# Patient Record
Sex: MALE | Race: WHITE | NOT HISPANIC OR LATINO
[De-identification: names, ages, dates, MRNs, and addresses within clinical notes are randomized per-mention and may not be internally consistent; named-entity substitution may affect disease eponyms.]

---

## 2019-01-15 ENCOUNTER — TRANSCRIPTION ENCOUNTER (OUTPATIENT)
Age: 39
End: 2019-01-15

## 2021-05-05 ENCOUNTER — INPATIENT (INPATIENT)
Facility: HOSPITAL | Age: 41
LOS: 7 days | Discharge: HOME | End: 2021-05-13
Attending: INTERNAL MEDICINE | Admitting: INTERNAL MEDICINE
Payer: COMMERCIAL

## 2021-05-05 VITALS
RESPIRATION RATE: 22 BRPM | TEMPERATURE: 99 F | SYSTOLIC BLOOD PRESSURE: 130 MMHG | HEART RATE: 107 BPM | DIASTOLIC BLOOD PRESSURE: 94 MMHG | OXYGEN SATURATION: 81 %

## 2021-05-05 LAB
ALBUMIN SERPL ELPH-MCNC: 3.5 G/DL — SIGNIFICANT CHANGE UP (ref 3.5–5.2)
ALP SERPL-CCNC: 139 U/L — HIGH (ref 30–115)
ALT FLD-CCNC: 73 U/L — HIGH (ref 0–41)
ANION GAP SERPL CALC-SCNC: 14 MMOL/L — SIGNIFICANT CHANGE UP (ref 7–14)
ANISOCYTOSIS BLD QL: SIGNIFICANT CHANGE UP
AST SERPL-CCNC: 97 U/L — HIGH (ref 0–41)
BASE EXCESS BLDV CALC-SCNC: 4.4 MMOL/L — HIGH (ref -2–2)
BASOPHILS # BLD AUTO: 0 K/UL — SIGNIFICANT CHANGE UP (ref 0–0.2)
BASOPHILS NFR BLD AUTO: 0 % — SIGNIFICANT CHANGE UP (ref 0–1)
BILIRUB SERPL-MCNC: 0.3 MG/DL — SIGNIFICANT CHANGE UP (ref 0.2–1.2)
BUN SERPL-MCNC: 8 MG/DL — LOW (ref 10–20)
CA-I SERPL-SCNC: 1.07 MMOL/L — LOW (ref 1.12–1.3)
CALCIUM SERPL-MCNC: 7.7 MG/DL — LOW (ref 8.5–10.1)
CHLORIDE SERPL-SCNC: 102 MMOL/L — SIGNIFICANT CHANGE UP (ref 98–110)
CO2 SERPL-SCNC: 23 MMOL/L — SIGNIFICANT CHANGE UP (ref 17–32)
CREAT SERPL-MCNC: 0.5 MG/DL — LOW (ref 0.7–1.5)
D DIMER BLD IA.RAPID-MCNC: 531 NG/ML DDU — HIGH (ref 0–230)
EOSINOPHIL # BLD AUTO: 0 K/UL — SIGNIFICANT CHANGE UP (ref 0–0.7)
EOSINOPHIL NFR BLD AUTO: 0 % — SIGNIFICANT CHANGE UP (ref 0–8)
GAS PNL BLDV: 140 MMOL/L — SIGNIFICANT CHANGE UP (ref 136–145)
GAS PNL BLDV: SIGNIFICANT CHANGE UP
GIANT PLATELETS BLD QL SMEAR: PRESENT — SIGNIFICANT CHANGE UP
GLUCOSE SERPL-MCNC: 132 MG/DL — HIGH (ref 70–99)
HCO3 BLDV-SCNC: 30 MMOL/L — HIGH (ref 22–29)
HCT VFR BLD CALC: 37.2 % — LOW (ref 42–52)
HCT VFR BLDA CALC: 50.6 % — HIGH (ref 34–44)
HGB BLD CALC-MCNC: 16.5 G/DL — SIGNIFICANT CHANGE UP (ref 14–18)
HGB BLD-MCNC: 12.4 G/DL — LOW (ref 14–18)
LACTATE BLDV-MCNC: 2.2 MMOL/L — HIGH (ref 0.5–1.6)
LYMPHOCYTES # BLD AUTO: 0.23 K/UL — LOW (ref 1.2–3.4)
LYMPHOCYTES # BLD AUTO: 4.3 % — LOW (ref 20.5–51.1)
MANUAL SMEAR VERIFICATION: SIGNIFICANT CHANGE UP
MCHC RBC-ENTMCNC: 27.1 PG — SIGNIFICANT CHANGE UP (ref 27–31)
MCHC RBC-ENTMCNC: 33.3 G/DL — SIGNIFICANT CHANGE UP (ref 32–37)
MCV RBC AUTO: 81.2 FL — SIGNIFICANT CHANGE UP (ref 80–94)
MICROCYTES BLD QL: SIGNIFICANT CHANGE UP
MONOCYTES # BLD AUTO: 0.19 K/UL — SIGNIFICANT CHANGE UP (ref 0.1–0.6)
MONOCYTES NFR BLD AUTO: 3.5 % — SIGNIFICANT CHANGE UP (ref 1.7–9.3)
MYELOCYTES NFR BLD: 0.9 % — HIGH (ref 0–0)
NEUTROPHILS # BLD AUTO: 4.88 K/UL — SIGNIFICANT CHANGE UP (ref 1.4–6.5)
NEUTROPHILS NFR BLD AUTO: 84.3 % — HIGH (ref 42.2–75.2)
NEUTS BAND # BLD: 7 % — HIGH (ref 0–6)
PCO2 BLDV: 48 MMHG — SIGNIFICANT CHANGE UP (ref 41–51)
PH BLDV: 7.41 — SIGNIFICANT CHANGE UP (ref 7.26–7.43)
PLAT MORPH BLD: NORMAL — SIGNIFICANT CHANGE UP
PLATELET # BLD AUTO: 158 K/UL — SIGNIFICANT CHANGE UP (ref 130–400)
PO2 BLDV: 24 MMHG — SIGNIFICANT CHANGE UP (ref 20–40)
POIKILOCYTOSIS BLD QL AUTO: SLIGHT — SIGNIFICANT CHANGE UP
POLYCHROMASIA BLD QL SMEAR: SLIGHT — SIGNIFICANT CHANGE UP
POTASSIUM BLDV-SCNC: 3.9 MMOL/L — SIGNIFICANT CHANGE UP (ref 3.3–5.6)
POTASSIUM SERPL-MCNC: 4 MMOL/L — SIGNIFICANT CHANGE UP (ref 3.5–5)
POTASSIUM SERPL-SCNC: 4 MMOL/L — SIGNIFICANT CHANGE UP (ref 3.5–5)
PROT SERPL-MCNC: 6.1 G/DL — SIGNIFICANT CHANGE UP (ref 6–8)
RBC # BLD: 4.58 M/UL — LOW (ref 4.7–6.1)
RBC # FLD: 13 % — SIGNIFICANT CHANGE UP (ref 11.5–14.5)
RBC BLD AUTO: ABNORMAL
SAO2 % BLDV: 40 % — SIGNIFICANT CHANGE UP
SARS-COV-2 RNA SPEC QL NAA+PROBE: DETECTED
SODIUM SERPL-SCNC: 139 MMOL/L — SIGNIFICANT CHANGE UP (ref 135–146)
TROPONIN T SERPL-MCNC: <0.01 NG/ML — SIGNIFICANT CHANGE UP
WBC # BLD: 5.35 K/UL — SIGNIFICANT CHANGE UP (ref 4.8–10.8)
WBC # FLD AUTO: 5.35 K/UL — SIGNIFICANT CHANGE UP (ref 4.8–10.8)

## 2021-05-05 PROCEDURE — 71045 X-RAY EXAM CHEST 1 VIEW: CPT | Mod: 26

## 2021-05-05 PROCEDURE — 93010 ELECTROCARDIOGRAM REPORT: CPT

## 2021-05-05 PROCEDURE — 93970 EXTREMITY STUDY: CPT | Mod: 26

## 2021-05-05 PROCEDURE — 99285 EMERGENCY DEPT VISIT HI MDM: CPT

## 2021-05-05 RX ORDER — CEFTRIAXONE 500 MG/1
1000 INJECTION, POWDER, FOR SOLUTION INTRAMUSCULAR; INTRAVENOUS EVERY 24 HOURS
Refills: 0 | Status: DISCONTINUED | OUTPATIENT
Start: 2021-05-06 | End: 2021-05-06

## 2021-05-05 RX ORDER — CHLORHEXIDINE GLUCONATE 213 G/1000ML
1 SOLUTION TOPICAL
Refills: 0 | Status: DISCONTINUED | OUTPATIENT
Start: 2021-05-05 | End: 2021-05-13

## 2021-05-05 RX ORDER — ENOXAPARIN SODIUM 100 MG/ML
40 INJECTION SUBCUTANEOUS EVERY 12 HOURS
Refills: 0 | Status: DISCONTINUED | OUTPATIENT
Start: 2021-05-05 | End: 2021-05-07

## 2021-05-05 RX ORDER — AZITHROMYCIN 500 MG/1
500 TABLET, FILM COATED ORAL EVERY 24 HOURS
Refills: 0 | Status: DISCONTINUED | OUTPATIENT
Start: 2021-05-06 | End: 2021-05-06

## 2021-05-05 RX ORDER — REMDESIVIR 5 MG/ML
200 INJECTION INTRAVENOUS EVERY 24 HOURS
Refills: 0 | Status: COMPLETED | OUTPATIENT
Start: 2021-05-05 | End: 2021-05-06

## 2021-05-05 RX ORDER — CEFTRIAXONE 500 MG/1
1000 INJECTION, POWDER, FOR SOLUTION INTRAMUSCULAR; INTRAVENOUS ONCE
Refills: 0 | Status: COMPLETED | OUTPATIENT
Start: 2021-05-05 | End: 2021-05-05

## 2021-05-05 RX ORDER — REMDESIVIR 5 MG/ML
INJECTION INTRAVENOUS
Refills: 0 | Status: COMPLETED | OUTPATIENT
Start: 2021-05-05 | End: 2021-05-10

## 2021-05-05 RX ORDER — DEXAMETHASONE 0.5 MG/5ML
6 ELIXIR ORAL DAILY
Refills: 0 | Status: DISCONTINUED | OUTPATIENT
Start: 2021-05-05 | End: 2021-05-07

## 2021-05-05 RX ORDER — AZITHROMYCIN 500 MG/1
500 TABLET, FILM COATED ORAL ONCE
Refills: 0 | Status: COMPLETED | OUTPATIENT
Start: 2021-05-05 | End: 2021-05-05

## 2021-05-05 RX ADMIN — ENOXAPARIN SODIUM 40 MILLIGRAM(S): 100 INJECTION SUBCUTANEOUS at 23:36

## 2021-05-05 RX ADMIN — Medication 6 MILLIGRAM(S): at 23:36

## 2021-05-05 NOTE — ED PROVIDER NOTE - OBJECTIVE STATEMENT
40y M pmh COVID (+) since 5/2 presents for sob. Pt has worsening sob since being diagnosed with COVID 5/2, mild improvement with NC, aggravated with ambulation. Associated np cough. Pt states he was hospitalized at [facility] but AMA'd. Denies, fever, ha, cp, weakness, numbness, dysuria, hematuria, n/v/d/c

## 2021-05-05 NOTE — ED PROVIDER NOTE - ATTENDING CONTRIBUTION TO CARE
41 y/o male with no sig pmhx in ER with covid related complaints.  Started having symptoms last week along with other family members, had + test 3 days ago. Pt in ER with c/o SOB and worsening REID.  mild cough.  denies CP. no fever.  no abd pain.  no v/d.  no LE pain/swelling.  No ha/dizziness/syncope. Pt went to [facility] for monoclonal ab treatment, was found to be hypoxic, given zithro, rocephin, dose of decadron.  Pt says his family his here and didn't want to be admitted at [place], so came here.    PE - nad, nc/art, eomi, perrl, op - clear, mmm, neck supple, + mildly increased WOB but able to speak full sentences, + b/l crackles, rrr, abd- soft, nt/nd, nabs, from x 4, no LE swelling/tenderness, A&O x 3, no focal neuro deficits  -Pt hypoxic in ER (86% on NRB), to get HFNC, check labs, cxr, ICU eval

## 2021-05-05 NOTE — H&P ADULT - ATTENDING COMMENTS
39 y/o obese Male without significant PMX presented to the ED with worsening SOB and Hypoxia, admitted to SDU with severe COVID PNA  Onset of symptoms 5/1    Acute hypoxemic respiratory failure secondary to COVID 19 PNA  Suspected superimposed bacterial PNA  Obesity  Transaminitis  Currently on HFNC 60/100 saturating 91%  Procal 2.08, Ddimer 531   LE duplex 5/5 without DVTs,, c/w Lovenox for DVT ppx  Rocephin 2g Q24H and Levaquin 500 Q24H  check blood cx, Urine strep and legionella  on RDV started 5/5 and Dexamethasone 6mg daily, started 5/5  Too early for toci currently, encourage incentive spirometry   taper down supplemental oxygen as tolerated  trend inflammatory markers  pulm cc following    Patient requires continuous monitoring in CEU

## 2021-05-05 NOTE — H&P ADULT - HISTORY OF PRESENT ILLNESS
41 y/o M with no PMH presents with SOB. Patient woke up May 1 with bad headache. His wife had tested positive for COVID, so he went to get tested and was positive. Today he noticed his SOB was worsening so he went get plasma, while there they noted he was saturating in low 80s so they did not give him plasma and sent him to the hospital. He denies chest pain, leg pain, abdominal pain, fever, chills or any other symptoms.    ED course: T(F): 99.1, HR: 91, BP: 130/78, RR: 20, SpO2: 82% on 5L. Xray showed diffuse b/l patchy opacities. COVID positive. He received ceftriaxone and azithromycin and is admitted to ICU with diagnosis of COVID pneumonia for close monitoring. 39 y/o M with no PMH presents with SOB. Patient woke up May 1 with bad headache. His wife had tested positive for COVID, so he went to get tested and was positive. Today he noticed his SOB was worsening so he went get plasma, while there they noted he was saturating in low 80s so they did not give him plasma and sent him to the hospital. He denies chest pain, leg pain, abdominal pain, fever, chills or any other symptoms.    ED course: T(F): 99.1, HR: 91, BP: 130/78, RR: 20, SpO2: 82% on 5L. Xray showed diffuse b/l patchy opacities. COVID positive. High flow at 60/100%, saturating 98%. He received ceftriaxone and azithromycin and is admitted to ICU with diagnosis of COVID pneumonia for close monitoring. 39 y/o M, obese with no PMH presents with SOB. Patient woke up May 1 with bad headache. His wife had tested positive for COVID, so he went to get tested and was positive. Today he noticed his SOB was worsening so he went get plasma, while there they noted he was saturating in low 80s so they did not give him plasma and sent him to the hospital. He denies chest pain, leg pain, abdominal pain, fever, chills or any other symptoms.    ED course: T(F): 99.1, HR: 91, BP: 130/78, RR: 20, SpO2: 82% on 5L. Xray showed diffuse b/l patchy opacities. COVID positive. High flow at 60/100%, saturating 98%. He received ceftriaxone and azithromycin and is admitted to ICU with diagnosis of COVID pneumonia for close monitoring.

## 2021-05-05 NOTE — H&P ADULT - NSHPPHYSICALEXAM_GEN_ALL_CORE
Vital Signs Last 24 Hrs  T(C): 37.3 (05 May 2021 21:41), Max: 37.5 (05 May 2021 20:48)  T(F): 99.1 (05 May 2021 21:41), Max: 99.5 (05 May 2021 20:48)  HR: 91 (05 May 2021 21:41) (90 - 107)  BP: 130/78 (05 May 2021 21:41) (119/67 - 130/94)  BP(mean): 95 (05 May 2021 19:30) (95 - 95)  RR: 20 (05 May 2021 21:41) (20 - 22)  SpO2: 93% (05 May 2021 21:41) (81% - 96%)        GENERAL: NAD, lying in bed comfortably  HEAD:  Atraumatic, Normocephalic  EYES: EOMI, PERRLA, conjunctiva and sclera clear  ENT: Moist mucous membranes  NECK: Supple, No JVD  CHEST/LUNG: Unlabored respirations  HEART: Regular rate and rhythm; No murmurs, rubs, or gallops  ABDOMEN: Bowel sounds present; Soft, Nontender  EXTREMITIES: No clubbing, cyanosis, or edema  NERVOUS SYSTEM:  Alert & Oriented X3, speech clear. No deficits Vital Signs Last 24 Hrs  T(C): 37.3 (05 May 2021 21:41), Max: 37.5 (05 May 2021 20:48)  T(F): 99.1 (05 May 2021 21:41), Max: 99.5 (05 May 2021 20:48)  HR: 91 (05 May 2021 21:41) (90 - 107)  BP: 130/78 (05 May 2021 21:41) (119/67 - 130/94)  BP(mean): 95 (05 May 2021 19:30) (95 - 95)  RR: 20 (05 May 2021 21:41) (20 - 22)  SpO2: 93% (05 May 2021 21:41) (81% - 96%)      GENERAL: NAD, lying in bed comfortably  HEAD:  Atraumatic, Normocephalic  EYES: EOMI, conjunctiva and sclera clear  ENT: Moist mucous membranes  NECK: Supple, No JVD  CHEST/LUNG: Unlabored respirations, coarse breath sounds  HEART: Regular rate and rhythm; No murmurs, rubs, or gallops  ABDOMEN: Bowel sounds present; Soft, Nontender  EXTREMITIES: No clubbing, cyanosis, or edema  NERVOUS SYSTEM:  Alert & Oriented X3, speech clear. No deficits

## 2021-05-05 NOTE — ED ADULT NURSE NOTE - NSSEPSISNEWALTERMENTAL_ED_A_ED
No Implemented All Universal Safety Interventions:  [place] to call system. Call bell, personal items and telephone within reach. Instruct patient to call for assistance. Room bathroom lighting operational. Non-slip footwear when patient is off stretcher. Physically safe environment: no spills, clutter or unnecessary equipment. Stretcher in lowest position, wheels locked, appropriate side rails in place.

## 2021-05-05 NOTE — ED ADULT NURSE REASSESSMENT NOTE - NS ED NURSE REASSESS COMMENT FT1
Assumed care from previous day shift nurse Lety CALDERON c/o SOB , + COVID , for ICU admission , awaiting for bed . Pt AO x 4 , on high flow nasal cannula 60 L, 100 % FIO2 , afebrile this time , no respiratory distress noted this time , pt denies chest pain , speaks in full sentences , denies headache , denies dizziness , denies palpitation , denies  abdominal pain , denies nausea , no vomiting noted , no syncopal episode , no seizure episode , noted bilateral swelling with mild redness at bilateral LE , pt able to move all extremities , call light within reached , placed on continuous cardiac monitor , nursing rounds done

## 2021-05-05 NOTE — H&P ADULT - ASSESSMENT
IMPRESSION:  Severe COVID pneumonia with possible superimposed community acquired pneumonia      PLAN:    CNS: Off sedatives    HEENT: Oral care    PULMONARY:  HOB @ 45 degrees. Dexamethasone 6 daily. High flow with BiPAP at night    CARDIOVASCULAR: I = O    GI: Feeding    RENAL:  Follow up lytes.  Correct as needed    INFECTIOUS DISEASE: Remdesivir. COVID antibodies for plasma. F/u inflammatory markers. Continue ceftriaxone and azithromycin until procal negative    HEMATOLOGICAL:  DVT prophylaxis. LE U/S    ENDOCRINE:  Follow up FS.  Insulin protocol if needed.    MUSCULOSKELETAL: ambulate as tolerated           IMPRESSION:  Severe COVID pneumonia with possible superimposed community acquired pneumonia      PLAN:    CNS: Off sedatives    HEENT: Oral care    PULMONARY:  HOB @ 45 degrees. Dexamethasone 6 daily. High flow with BiPAP at night. Titrate FiO2 to target saturation >91%    CARDIOVASCULAR: I = O    GI: Feeding    RENAL:  Follow up lytes.  Correct as needed    INFECTIOUS DISEASE: Remdesivir. COVID antibodies for plasma. F/u inflammatory markers. Continue ceftriaxone and azithromycin until procal negative    HEMATOLOGICAL:  DVT prophylaxis. LE U/S    ENDOCRINE:  Follow up FS.  Insulin protocol if needed.    MUSCULOSKELETAL: ambulate as tolerated

## 2021-05-05 NOTE — H&P ADULT - NSHPLABSRESULTS_GEN_ALL_CORE
LABS:  cret                        12.4   5.35  )-----------( 158      ( 05 May 2021 14:52 )             37.2     05-05    139  |  102  |  8<L>  ----------------------------<  132<H>  4.0   |  23  |  0.5<L>    Ca    7.7<L>      05 May 2021 14:52    TPro  6.1  /  Alb  3.5  /  TBili  0.3  /  DBili  x   /  AST  97<H>  /  ALT  73<H>  /  AlkPhos  139<H>  05-05

## 2021-05-05 NOTE — ED ADULT TRIAGE NOTE - AS TEMP SITE
Per Bertis Litten, PA-C, a message was left on [facility]' voicemail notifying them that Ehsan Colon had changed the directions on the Mirtazapine to 1/2 tablet by mouth nightly. oral

## 2021-05-05 NOTE — ED PROVIDER NOTE - PROGRESS NOTE DETAILS
Pt placed on HFNC with improvement in O2 sat to 97%, pt feels more comfortable. Labs reviewed.  CXR with extensive b/l infiltrates.  Pt already received abx and dex at OSH earlier today. Case d/w ICU fellow, pt admitted to ICU for continued treatment and monitoring.

## 2021-05-05 NOTE — ED PROVIDER NOTE - PHYSICAL EXAMINATION
CONST: NAD  EYES: Sclera and conjunctiva clear.   ENT: No nasal discharge. Oropharynx normal appearing, no erythema or exudates. No abscess or swelling. Uvula midline.   NECK: Non-tender, no meningeal signs. normal ROM. supple   CARD: S1 S2; No jvd  RESP: Diffuse rhonchi. spO2 86% nrb, improved to 96% on High Flow  GI: Soft, non-tender, non-distended. no cva tenderness. normal BS  MS: Normal ROM in all extremities. pulses 2 +. no calf tenderness or swelling  SKIN: Warm, dry, no acute rashes. Good turgor  NEURO: A&Ox3, No focal deficits. Strength 5/5 with no sensory deficits.

## 2021-05-05 NOTE — ED PROVIDER NOTE - NS ED ROS FT
Constitutional: (-) fever  Eyes/ENT: (-) blurry vision, (-) epistaxis  Cardiovascular: (-) chest pain, (-) syncope  Respiratory: (+) cough, (+) shortness of breath  Gastrointestinal: (-) vomiting, (-) diarrhea  : (-) dysuria, (-) hematuria  Musculoskeletal: (-) neck pain, (-) back pain, (-) joint pain  Integumentary: (-) rash, (-) edema  Neurological: (-) headache, (-) altered mental status  Allergic/Immunologic: (-) pruritus

## 2021-05-06 LAB
ALBUMIN SERPL ELPH-MCNC: 3.1 G/DL — LOW (ref 3.5–5.2)
ALP SERPL-CCNC: 140 U/L — HIGH (ref 30–115)
ALT FLD-CCNC: 67 U/L — HIGH (ref 0–41)
ANION GAP SERPL CALC-SCNC: 15 MMOL/L — HIGH (ref 7–14)
AST SERPL-CCNC: 87 U/L — HIGH (ref 0–41)
BASOPHILS # BLD AUTO: 0.01 K/UL — SIGNIFICANT CHANGE UP (ref 0–0.2)
BASOPHILS NFR BLD AUTO: 0.2 % — SIGNIFICANT CHANGE UP (ref 0–1)
BILIRUB SERPL-MCNC: 0.3 MG/DL — SIGNIFICANT CHANGE UP (ref 0.2–1.2)
BLD GP AB SCN SERPL QL: SIGNIFICANT CHANGE UP
BUN SERPL-MCNC: 12 MG/DL — SIGNIFICANT CHANGE UP (ref 10–20)
CALCIUM SERPL-MCNC: 8 MG/DL — LOW (ref 8.5–10.1)
CHLORIDE SERPL-SCNC: 100 MMOL/L — SIGNIFICANT CHANGE UP (ref 98–110)
CO2 SERPL-SCNC: 23 MMOL/L — SIGNIFICANT CHANGE UP (ref 17–32)
COVID-19 SPIKE DOMAIN AB INTERP: POSITIVE
COVID-19 SPIKE DOMAIN AB INTERP: POSITIVE
COVID-19 SPIKE DOMAIN ANTIBODY RESULT: 19.6 U/ML — HIGH
COVID-19 SPIKE DOMAIN ANTIBODY RESULT: 52.9 U/ML — HIGH
CREAT SERPL-MCNC: 0.5 MG/DL — LOW (ref 0.7–1.5)
CRP SERPL-MCNC: 347 MG/L — HIGH
EOSINOPHIL # BLD AUTO: 0 K/UL — SIGNIFICANT CHANGE UP (ref 0–0.7)
EOSINOPHIL NFR BLD AUTO: 0 % — SIGNIFICANT CHANGE UP (ref 0–8)
FERRITIN SERPL-MCNC: 1353 NG/ML — HIGH (ref 30–400)
GLUCOSE SERPL-MCNC: 144 MG/DL — HIGH (ref 70–99)
HCT VFR BLD CALC: 37.5 % — LOW (ref 42–52)
HGB BLD-MCNC: 12.3 G/DL — LOW (ref 14–18)
IMM GRANULOCYTES NFR BLD AUTO: 1.8 % — HIGH (ref 0.1–0.3)
LYMPHOCYTES # BLD AUTO: 0.45 K/UL — LOW (ref 1.2–3.4)
LYMPHOCYTES # BLD AUTO: 8.3 % — LOW (ref 20.5–51.1)
MCHC RBC-ENTMCNC: 26.9 PG — LOW (ref 27–31)
MCHC RBC-ENTMCNC: 32.8 G/DL — SIGNIFICANT CHANGE UP (ref 32–37)
MCV RBC AUTO: 81.9 FL — SIGNIFICANT CHANGE UP (ref 80–94)
MONOCYTES # BLD AUTO: 0.11 K/UL — SIGNIFICANT CHANGE UP (ref 0.1–0.6)
MONOCYTES NFR BLD AUTO: 2 % — SIGNIFICANT CHANGE UP (ref 1.7–9.3)
NEUTROPHILS # BLD AUTO: 4.77 K/UL — SIGNIFICANT CHANGE UP (ref 1.4–6.5)
NEUTROPHILS NFR BLD AUTO: 87.7 % — HIGH (ref 42.2–75.2)
NRBC # BLD: 0 /100 WBCS — SIGNIFICANT CHANGE UP (ref 0–0)
PLATELET # BLD AUTO: 181 K/UL — SIGNIFICANT CHANGE UP (ref 130–400)
POTASSIUM SERPL-MCNC: 3.7 MMOL/L — SIGNIFICANT CHANGE UP (ref 3.5–5)
POTASSIUM SERPL-SCNC: 3.7 MMOL/L — SIGNIFICANT CHANGE UP (ref 3.5–5)
PROCALCITONIN SERPL-MCNC: 2.08 NG/ML — HIGH (ref 0.02–0.1)
PROT SERPL-MCNC: 5.8 G/DL — LOW (ref 6–8)
RBC # BLD: 4.58 M/UL — LOW (ref 4.7–6.1)
RBC # FLD: 12.9 % — SIGNIFICANT CHANGE UP (ref 11.5–14.5)
SARS-COV-2 IGG+IGM SERPL QL IA: 19.6 U/ML — HIGH
SARS-COV-2 IGG+IGM SERPL QL IA: 52.9 U/ML — HIGH
SARS-COV-2 IGG+IGM SERPL QL IA: POSITIVE
SARS-COV-2 IGG+IGM SERPL QL IA: POSITIVE
SODIUM SERPL-SCNC: 138 MMOL/L — SIGNIFICANT CHANGE UP (ref 135–146)
WBC # BLD: 5.44 K/UL — SIGNIFICANT CHANGE UP (ref 4.8–10.8)
WBC # FLD AUTO: 5.44 K/UL — SIGNIFICANT CHANGE UP (ref 4.8–10.8)

## 2021-05-06 PROCEDURE — 99223 1ST HOSP IP/OBS HIGH 75: CPT

## 2021-05-06 PROCEDURE — 99222 1ST HOSP IP/OBS MODERATE 55: CPT

## 2021-05-06 RX ORDER — CEFTRIAXONE 500 MG/1
2000 INJECTION, POWDER, FOR SOLUTION INTRAMUSCULAR; INTRAVENOUS EVERY 24 HOURS
Refills: 0 | Status: COMPLETED | OUTPATIENT
Start: 2021-05-06 | End: 2021-05-11

## 2021-05-06 RX ORDER — REMDESIVIR 5 MG/ML
100 INJECTION INTRAVENOUS EVERY 24 HOURS
Refills: 0 | Status: COMPLETED | OUTPATIENT
Start: 2021-05-07 | End: 2021-05-10

## 2021-05-06 RX ORDER — ACETAMINOPHEN 500 MG
650 TABLET ORAL ONCE
Refills: 0 | Status: COMPLETED | OUTPATIENT
Start: 2021-05-06 | End: 2021-05-06

## 2021-05-06 RX ORDER — ALPRAZOLAM 0.25 MG
0.25 TABLET ORAL ONCE
Refills: 0 | Status: DISCONTINUED | OUTPATIENT
Start: 2021-05-06 | End: 2021-05-06

## 2021-05-06 RX ADMIN — Medication 0.25 MILLIGRAM(S): at 22:10

## 2021-05-06 RX ADMIN — ENOXAPARIN SODIUM 40 MILLIGRAM(S): 100 INJECTION SUBCUTANEOUS at 17:34

## 2021-05-06 RX ADMIN — AZITHROMYCIN 255 MILLIGRAM(S): 500 TABLET, FILM COATED ORAL at 08:06

## 2021-05-06 RX ADMIN — CEFTRIAXONE 100 MILLIGRAM(S): 500 INJECTION, POWDER, FOR SOLUTION INTRAMUSCULAR; INTRAVENOUS at 11:20

## 2021-05-06 RX ADMIN — Medication 650 MILLIGRAM(S): at 11:26

## 2021-05-06 RX ADMIN — Medication 0.25 MILLIGRAM(S): at 05:17

## 2021-05-06 RX ADMIN — ENOXAPARIN SODIUM 40 MILLIGRAM(S): 100 INJECTION SUBCUTANEOUS at 06:28

## 2021-05-06 RX ADMIN — REMDESIVIR 500 MILLIGRAM(S): 5 INJECTION INTRAVENOUS at 05:17

## 2021-05-06 RX ADMIN — CEFTRIAXONE 100 MILLIGRAM(S): 500 INJECTION, POWDER, FOR SOLUTION INTRAMUSCULAR; INTRAVENOUS at 08:06

## 2021-05-06 NOTE — PROGRESS NOTE ADULT - SUBJECTIVE AND OBJECTIVE BOX
MICHAEL ARENAS 40y Male  MRN#: [id number]   CODE STATUS:_full      SUBJECTIVE  Patient is a 40y old Male who presents with a chief complaint of COVID (06 May 2021 09:57)  Currently admitted to medicine with the primary diagnosis of COVID-19    Today is hospital day 1d, and this morning he is feeling well and reports no events overnight. Denies shortness of breath currently while in bed on HFNC.         OBJECTIVE  PAST MEDICAL & SURGICAL HISTORY  No pertinent past medical history    No significant past surgical history      ALLERGIES:  No Known Allergies    MEDICATIONS:  STANDING MEDICATIONS  cefTRIAXone   IVPB 2000 milliGRAM(s) IV Intermittent every 24 hours  chlorhexidine 4% Liquid 1 Application(s) Topical <User Schedule>  dexAMETHasone  Injectable 6 milliGRAM(s) IV Push daily  enoxaparin Injectable 40 milliGRAM(s) SubCutaneous every 12 hours  levoFLOXacin IVPB      remdesivir  IVPB   IV Intermittent     PRN MEDICATIONS      VITAL SIGNS: Last 24 Hours  T(C): 37.3 (06 May 2021 08:28), Max: 37.5 (05 May 2021 20:48)  T(F): 99.2 (06 May 2021 08:28), Max: 99.5 (05 May 2021 20:48)  HR: 85 (06 May 2021 08:28) (81 - 107)  BP: 122/73 (06 May 2021 08:28) (113/58 - 153/72)  BP(mean): 95 (05 May 2021 19:30) (95 - 95)  RR: 20 (06 May 2021 08:28) (20 - 22)  SpO2: 91% (06 May 2021 08:28) (81% - 96%)    LABS:                        12.3   5.44  )-----------( 181      ( 06 May 2021 08:28 )             37.5     05-06    138  |  100  |  12  ----------------------------<  144<H>  3.7   |  23  |  0.5<L>    Ca    8.0<L>      06 May 2021 08:28    TPro  5.8<L>  /  Alb  3.1<L>  /  TBili  0.3  /  DBili  x   /  AST  87<H>  /  ALT  67<H>  /  AlkPhos  140<H>  05-06          Troponin T, Serum: <0.01 ng/mL (05-05-21 @ 14:52)      CARDIAC MARKERS ( 05 May 2021 14:52 )  x     / <0.01 ng/mL / x     / x     / x          RADIOLOGY:      PHYSICAL EXAM:    GENERAL: NAD, well-developed, AAOx3  HEENT:  Atraumatic, Normocephalic. EOMI, PERRLA,  PULMONARY: Bilateral crackles throughout all lung fields.   CARDIOVASCULAR: Regular rate and rhythm; No murmurs,   GASTROINTESTINAL: Soft, Nontender, Nondistended; Bowel sounds present  MUSCULOSKELETAL:  2+ Peripheral Pulses, No clubbing, cyanosis, or edema  NEUROLOGY: non-focal  SKIN: No rashes or lesions      ADMISSION SUMMARY  Patient is a 40y old Male who presents with a chief complaint of COVID (06 May 2021 09:57)  Currently admitted to medicine with the primary diagnosis of COVID-19    Hospital course has been complicated by _______.       ASSESSMENT & PLAN  39 y/o no significant past medical history presenting for shortness of breath since 5/3 (tested positive for COVID outpatient 5/1? after he had headache), admitted for severe COVID PNA requiring HFNC to maintain O2 saturation.     #Severe COVID PNA with suspected superimposed bacterial infection   -f/u urine strep/legionella (pending), blood culture (pending)   -, ferritin 1353, procalcitonin 2.08, d-dimer 531.   -Lovenox 40mg BID as BMI>30 with COVID   -ID followup - too early for toci - plan to give day 10, currently he is around day 5. abx changed: ceftriaxone 2g q24 and levaquin 500mg BID  MICHAEL ARENAS 40y Male  MRN#: [id number]   CODE STATUS:_full      SUBJECTIVE  Patient is a 40y old Male who presents with a chief complaint of COVID (06 May 2021 09:57)  Currently admitted to medicine with the primary diagnosis of COVID-19    Today is hospital day 1d, and this morning he is feeling well and reports no events overnight. Denies shortness of breath currently while in bed on HFNC.         OBJECTIVE  PAST MEDICAL & SURGICAL HISTORY  No pertinent past medical history    No significant past surgical history      ALLERGIES:  No Known Allergies    MEDICATIONS:  STANDING MEDICATIONS  cefTRIAXone   IVPB 2000 milliGRAM(s) IV Intermittent every 24 hours  chlorhexidine 4% Liquid 1 Application(s) Topical <User Schedule>  dexAMETHasone  Injectable 6 milliGRAM(s) IV Push daily  enoxaparin Injectable 40 milliGRAM(s) SubCutaneous every 12 hours  levoFLOXacin IVPB      remdesivir  IVPB   IV Intermittent     PRN MEDICATIONS      VITAL SIGNS: Last 24 Hours  T(C): 37.3 (06 May 2021 08:28), Max: 37.5 (05 May 2021 20:48)  T(F): 99.2 (06 May 2021 08:28), Max: 99.5 (05 May 2021 20:48)  HR: 85 (06 May 2021 08:28) (81 - 107)  BP: 122/73 (06 May 2021 08:28) (113/58 - 153/72)  BP(mean): 95 (05 May 2021 19:30) (95 - 95)  RR: 20 (06 May 2021 08:28) (20 - 22)  SpO2: 91% (06 May 2021 08:28) (81% - 96%)    LABS:                        12.3   5.44  )-----------( 181      ( 06 May 2021 08:28 )             37.5     05-06    138  |  100  |  12  ----------------------------<  144<H>  3.7   |  23  |  0.5<L>    Ca    8.0<L>      06 May 2021 08:28    TPro  5.8<L>  /  Alb  3.1<L>  /  TBili  0.3  /  DBili  x   /  AST  87<H>  /  ALT  67<H>  /  AlkPhos  140<H>  05-06          Troponin T, Serum: <0.01 ng/mL (05-05-21 @ 14:52)      CARDIAC MARKERS ( 05 May 2021 14:52 )  x     / <0.01 ng/mL / x     / x     / x          RADIOLOGY:      PHYSICAL EXAM:    GENERAL: NAD, well-developed, AAOx3  HEENT:  Atraumatic, Normocephalic. EOMI, PERRLA,  PULMONARY: Bilateral crackles throughout all lung fields.   CARDIOVASCULAR: Regular rate and rhythm; No murmurs,   GASTROINTESTINAL: Soft, Nontender, Nondistended; Bowel sounds present  MUSCULOSKELETAL:  2+ Peripheral Pulses, No clubbing, cyanosis, or edema  NEUROLOGY: non-focal  SKIN: No rashes or lesions      ADMISSION SUMMARY  Patient is a 40y old Male who presents with a chief complaint of COVID (06 May 2021 09:57)  Currently admitted to medicine with the primary diagnosis of COVID-19    Hospital course has been complicated by _______.       ASSESSMENT & PLAN  41 y/o no significant past medical history presenting for shortness of breath since 5/3 (tested positive for COVID outpatient 5/1? after he had headache), admitted for severe COVID PNA requiring HFNC to maintain O2 saturation.     #Severe COVID PNA with suspected superimposed bacterial infection   -f/u urine strep/legionella (pending), blood culture (pending)   -, ferritin 1353, procalcitonin 2.08, d-dimer 531.   -Lovenox 40mg BID as BMI>30 with COVID   -ID followup - too early for toci - plan to give day 10, currently he is around day 5. abx changed: ceftriaxone 2g q24hr and levaquin 500mg q24hr  -CXR with extensive bilateral opacities  - c/w remdesivir (day 1 was 5/5), dexamethasone 6mg q24 (day 1 was 5/5)  - Wean O2 as tolerated , HFNC 60/100/ bipap overnight.   -f/u pulm   -LE duplex 5/5 negative for dvt (prelim read)     #Diet - regular   #GI ppx - not indicated  #DVT ppx - lovenox 40 BID   #Code status - full

## 2021-05-06 NOTE — CONSULT NOTE ADULT - SUBJECTIVE AND OBJECTIVE BOX
Patient is a 40y old  Male who presents with a chief complaint of COVID (05 May 2021 22:10)      HPI:  39 y/o M with no PMH presents with SOB. Patient woke up May 1 with bad headache. His wife had tested positive for COVID, so he went to get tested and was positive. Today he noticed his SOB was worsening so he went get plasma, while there they noted he was saturating in low 80s so they did not give him plasma and sent him to the hospital. He denies chest pain, leg pain, abdominal pain, fever, chills or any other symptoms.    ED course: T(F): 99.1, HR: 91, BP: 130/78, RR: 20, SpO2: 82% on 5L. Xray showed diffuse b/l patchy opacities. COVID positive. High flow at 60/100%, saturating 98%. He received ceftriaxone and azithromycin and is admitted to ICU with diagnosis of COVID pneumonia for close monitoring. (05 May 2021 22:10)      PAST MEDICAL & SURGICAL HISTORY:  No pertinent past medical history    No significant past surgical history        SOCIAL HX:   Smoking     No                    ETOH                            Other    FAMILY HISTORY:  :  No known cardiovacular family hisotry     Review Of Systems:     All ROS are negative except per HPI       Allergies    No Known Allergies    Intolerances          PHYSICAL EXAM    ICU Vital Signs Last 24 Hrs  T(C): 37.3 (06 May 2021 04:00), Max: 37.5 (05 May 2021 20:48)  T(F): 99.2 (06 May 2021 04:00), Max: 99.5 (05 May 2021 20:48)  HR: 86 (06 May 2021 07:00) (81 - 107)  BP: 134/76 (06 May 2021 07:00) (113/58 - 153/72)  BP(mean): 95 (05 May 2021 19:30) (95 - 95)  ABP: --  ABP(mean): --  RR: 20 (06 May 2021 07:00) (20 - 22)  SpO2: 91% (06 May 2021 07:00) (81% - 96%)      CONSTITUTIONAL:  Well nourished.   NAD    ENT:   Airway patent,   Mouth with normal mucosa.   No thrush      CARDIAC:   Normal rate,   Regular rhythm.    No edema      Vascular:   normal systolic impulse  no bruits    RESPIRATORY:   No wheezing  Bilateral crackles   Not tachypneic,  No use of accessory muscles    GASTROINTESTINAL:  Abdomen soft,   Non-tender,   No guarding,   + BS      NEUROLOGICAL:   Alert and oriented   No motor deficits.    SKIN:   Skin normal color for race,   No evidence of rash.      HEME LYMPH: .  No cervical  lymphadenopathy.  No inguinal lymphadenopathy            05-05-21 @ 07:01  -  05-06-21 @ 07:00  --------------------------------------------------------  IN:    IV PiggyBack: 250 mL    Oral Fluid: 100 mL  Total IN: 350 mL    OUT:    Voided (mL): 600 mL  Total OUT: 600 mL    Total NET: -250 mL          LABS:                          12.4   5.35  )-----------( 158      ( 05 May 2021 14:52 )             37.2                                               05-05    139  |  102  |  8<L>  ----------------------------<  132<H>  4.0   |  23  |  0.5<L>    Ca    7.7<L>      05 May 2021 14:52    TPro  6.1  /  Alb  3.5  /  TBili  0.3  /  DBili  x   /  AST  97<H>  /  ALT  73<H>  /  AlkPhos  139<H>  05-05                                                 CARDIAC MARKERS ( 05 May 2021 14:52 )  x     / <0.01 ng/mL / x     / x     / x                                                LIVER FUNCTIONS - ( 05 May 2021 14:52 )  Alb: 3.5 g/dL / Pro: 6.1 g/dL / ALK PHOS: 139 U/L / ALT: 73 U/L / AST: 97 U/L / GGT: x                                                                                                                                       X-Rays reviewed                                                                                     ECHO    CXR interpreted by me Bilateral infiltrates     MEDICATIONS  (STANDING):  azithromycin  IVPB 500 milliGRAM(s) IV Intermittent every 24 hours  cefTRIAXone   IVPB 1000 milliGRAM(s) IV Intermittent every 24 hours  chlorhexidine 4% Liquid 1 Application(s) Topical <User Schedule>  dexAMETHasone  Injectable 6 milliGRAM(s) IV Push daily  enoxaparin Injectable 40 milliGRAM(s) SubCutaneous every 12 hours  remdesivir  IVPB   IV Intermittent     MEDICATIONS  (PRN):        
  DAGOBERTOMICHAEL  40y, Male  Allergy: No Known Allergies      All historical available data reviewed.    HPI:  39 y/o M with no PMH presents with SOB. Patient woke up May 1 with bad headache. His wife had tested positive for COVID, so he went to get tested and was positive. Today he noticed his SOB was worsening so he went get plasma, while there they noted he was saturating in low 80s so they did not give him plasma and sent him to the hospital. He denies chest pain, leg pain, abdominal pain, fever, chills or any other symptoms.    ED course: T(F): 99.1, HR: 91, BP: 130/78, RR: 20, SpO2: 82% on 5L. Xray showed diffuse b/l patchy opacities. COVID positive. High flow at 60/100%, saturating 98%. He received ceftriaxone and azithromycin and is admitted to ICU with diagnosis of COVID pneumonia for close monitoring. (05 May 2021 22:10)    FAMILY HISTORY:    PAST MEDICAL & SURGICAL HISTORY:  No pertinent past medical history    No significant past surgical history          VITALS:  T(F): 99.2, Max: 99.5 (05-05-21 @ 20:48)  HR: 85  BP: 122/73  RR: 20Vital Signs Last 24 Hrs  T(C): 37.3 (06 May 2021 08:28), Max: 37.5 (05 May 2021 20:48)  T(F): 99.2 (06 May 2021 08:28), Max: 99.5 (05 May 2021 20:48)  HR: 85 (06 May 2021 08:28) (81 - 107)  BP: 122/73 (06 May 2021 08:28) (113/58 - 153/72)  BP(mean): 95 (05 May 2021 19:30) (95 - 95)  RR: 20 (06 May 2021 08:28) (20 - 22)  SpO2: 91% (06 May 2021 08:28) (81% - 96%)    TESTS & MEASUREMENTS:                        12.3   5.44  )-----------( 181      ( 06 May 2021 08:28 )             37.5     05-05    139  |  102  |  8<L>  ----------------------------<  132<H>  4.0   |  23  |  0.5<L>    Ca    7.7<L>      05 May 2021 14:52    TPro  6.1  /  Alb  3.5  /  TBili  0.3  /  DBili  x   /  AST  97<H>  /  ALT  73<H>  /  AlkPhos  139<H>  05-05    LIVER FUNCTIONS - ( 05 May 2021 14:52 )  Alb: 3.5 g/dL / Pro: 6.1 g/dL / ALK PHOS: 139 U/L / ALT: 73 U/L / AST: 97 U/L / GGT: x                   RADIOLOGY & ADDITIONAL TESTS:  Personal review of radiological diagnostics performed  Echo and EKG results noted when applicable.     MEDICATIONS:  azithromycin  IVPB 500 milliGRAM(s) IV Intermittent every 24 hours  cefTRIAXone   IVPB 1000 milliGRAM(s) IV Intermittent every 24 hours  chlorhexidine 4% Liquid 1 Application(s) Topical <User Schedule>  dexAMETHasone  Injectable 6 milliGRAM(s) IV Push daily  enoxaparin Injectable 40 milliGRAM(s) SubCutaneous every 12 hours  remdesivir  IVPB   IV Intermittent       ANTIBIOTICS:  azithromycin  IVPB 500 milliGRAM(s) IV Intermittent every 24 hours  cefTRIAXone   IVPB 1000 milliGRAM(s) IV Intermittent every 24 hours  remdesivir  IVPB   IV Intermittent

## 2021-05-06 NOTE — CONSULT NOTE ADULT - ASSESSMENT
IMPRESSION:    Acute hypoxemic respiratoru failure   Severe COVID pneumonia  Probable superimposed bacterial pneumonia     PLAN:    CNS: No depressants     HEENT: Oral care    PULMONARY:  HOB @ 45 degrees.  Aspiration precautions.  Cotineu HFNCo2.  Wean O2 as tolerated.  Dexa TERRI#2.  Encourage incentive spirometry      CARDIOVASCULAR:  Avoid volume overload     GI: GI prophylaxis.  Feeding.  Bowel regimen     RENAL:  Follow up lytes.  Correct as needed    INFECTIOUS DISEASE: Follow up cultures.  Continue ABX.  Urine Legionella  and Strep Ag.  Toci.  Check COVID antibodies.  Nasal MRSA.      HEMATOLOGICAL:  DVT prophylaxis. LMWH.  LE duplex     ENDOCRINE:  Follow up FS.  Insulin protocol if needed.    MUSCULOSKELETAL:  Bed chair position.      CEU     Prognosis guarded         
39 y/o M with no PMH presents with SOB. Patient woke up May 1 with bad headache. His wife had tested positive for COVID, so he went to get tested and was positive. Today he noticed his SOB was worsening so he went get plasma, while there they noted he was saturating in low 80s so they did not give him plasma and sent him to the hospital.     IMPRESSION;  COVID 19 with severe illness. SpO2 < 94% on RA and need for supplemental O2.   Pt is in the early viral replicative phase based on the timeline/onset of symptoms.  Inflammatory markers are elevated and suggestive of a cytokine response/cytokine storm.  CXR > 50 % opacities and cannot exclude an underlying bacterial PNA and will recommend treating as such  procalcitonin 2.08  Ferritin 1353    Ddimers 531    RECOMMENDATIONS;  Target SpO2 92 % to 96 %  BCx  Urine for legionella/strep pneumonia antigen  Rocephin 2 gm iv q24h  Levoquin 500 mg iv q24h  Day 1 : Single  mg IV loading dose  Day 2-5 : single  mg IV once daily maintenance dose  Daily CBC,CMP.  Dexamethasone 6 mg iv q24h for 10 days.  Monitor for side effects: hyperglycemia, neurological ( agitation/confusion), adrenal suppression, bacterial and fungal infections  No Toci as it is still too early in the infection

## 2021-05-07 LAB
ALBUMIN SERPL ELPH-MCNC: 3.2 G/DL — LOW (ref 3.5–5.2)
ALP SERPL-CCNC: 142 U/L — HIGH (ref 30–115)
ALT FLD-CCNC: 52 U/L — HIGH (ref 0–41)
ANION GAP SERPL CALC-SCNC: 11 MMOL/L — SIGNIFICANT CHANGE UP (ref 7–14)
AST SERPL-CCNC: 71 U/L — HIGH (ref 0–41)
BASOPHILS # BLD AUTO: 0.03 K/UL — SIGNIFICANT CHANGE UP (ref 0–0.2)
BASOPHILS NFR BLD AUTO: 0.5 % — SIGNIFICANT CHANGE UP (ref 0–1)
BILIRUB SERPL-MCNC: 0.3 MG/DL — SIGNIFICANT CHANGE UP (ref 0.2–1.2)
BUN SERPL-MCNC: 14 MG/DL — SIGNIFICANT CHANGE UP (ref 10–20)
CALCIUM SERPL-MCNC: 8.3 MG/DL — LOW (ref 8.5–10.1)
CHLORIDE SERPL-SCNC: 98 MMOL/L — SIGNIFICANT CHANGE UP (ref 98–110)
CO2 SERPL-SCNC: 26 MMOL/L — SIGNIFICANT CHANGE UP (ref 17–32)
CREAT SERPL-MCNC: 0.5 MG/DL — LOW (ref 0.7–1.5)
D DIMER BLD IA.RAPID-MCNC: 2700 NG/ML DDU — HIGH (ref 0–230)
EOSINOPHIL # BLD AUTO: 0 K/UL — SIGNIFICANT CHANGE UP (ref 0–0.7)
EOSINOPHIL NFR BLD AUTO: 0 % — SIGNIFICANT CHANGE UP (ref 0–8)
FIBRINOGEN PPP-MCNC: >700 MG/DL — HIGH (ref 204.4–570.6)
GLUCOSE SERPL-MCNC: 111 MG/DL — HIGH (ref 70–99)
HCT VFR BLD CALC: 37.6 % — LOW (ref 42–52)
HGB BLD-MCNC: 12.1 G/DL — LOW (ref 14–18)
IMM GRANULOCYTES NFR BLD AUTO: 3.3 % — HIGH (ref 0.1–0.3)
INR BLD: 1.13 RATIO — SIGNIFICANT CHANGE UP (ref 0.65–1.3)
LYMPHOCYTES # BLD AUTO: 0.48 K/UL — LOW (ref 1.2–3.4)
LYMPHOCYTES # BLD AUTO: 7.6 % — LOW (ref 20.5–51.1)
MAGNESIUM SERPL-MCNC: 1.9 MG/DL — SIGNIFICANT CHANGE UP (ref 1.8–2.4)
MCHC RBC-ENTMCNC: 26 PG — LOW (ref 27–31)
MCHC RBC-ENTMCNC: 32.2 G/DL — SIGNIFICANT CHANGE UP (ref 32–37)
MCV RBC AUTO: 80.9 FL — SIGNIFICANT CHANGE UP (ref 80–94)
MONOCYTES # BLD AUTO: 0.28 K/UL — SIGNIFICANT CHANGE UP (ref 0.1–0.6)
MONOCYTES NFR BLD AUTO: 4.4 % — SIGNIFICANT CHANGE UP (ref 1.7–9.3)
NEUTROPHILS # BLD AUTO: 5.34 K/UL — SIGNIFICANT CHANGE UP (ref 1.4–6.5)
NEUTROPHILS NFR BLD AUTO: 84.2 % — HIGH (ref 42.2–75.2)
NRBC # BLD: 0 /100 WBCS — SIGNIFICANT CHANGE UP (ref 0–0)
PLATELET # BLD AUTO: 212 K/UL — SIGNIFICANT CHANGE UP (ref 130–400)
POTASSIUM SERPL-MCNC: 4 MMOL/L — SIGNIFICANT CHANGE UP (ref 3.5–5)
POTASSIUM SERPL-SCNC: 4 MMOL/L — SIGNIFICANT CHANGE UP (ref 3.5–5)
PROT SERPL-MCNC: 6.1 G/DL — SIGNIFICANT CHANGE UP (ref 6–8)
PROTHROM AB SERPL-ACNC: 13 SEC — HIGH (ref 9.95–12.87)
RBC # BLD: 4.65 M/UL — LOW (ref 4.7–6.1)
RBC # FLD: 13 % — SIGNIFICANT CHANGE UP (ref 11.5–14.5)
SODIUM SERPL-SCNC: 135 MMOL/L — SIGNIFICANT CHANGE UP (ref 135–146)
WBC # BLD: 6.34 K/UL — SIGNIFICANT CHANGE UP (ref 4.8–10.8)
WBC # FLD AUTO: 6.34 K/UL — SIGNIFICANT CHANGE UP (ref 4.8–10.8)

## 2021-05-07 PROCEDURE — 71045 X-RAY EXAM CHEST 1 VIEW: CPT | Mod: 26

## 2021-05-07 PROCEDURE — 99232 SBSQ HOSP IP/OBS MODERATE 35: CPT

## 2021-05-07 PROCEDURE — 99233 SBSQ HOSP IP/OBS HIGH 50: CPT

## 2021-05-07 RX ORDER — DEXAMETHASONE 0.5 MG/5ML
8 ELIXIR ORAL EVERY 8 HOURS
Refills: 0 | Status: DISCONTINUED | OUTPATIENT
Start: 2021-05-07 | End: 2021-05-12

## 2021-05-07 RX ORDER — DEXAMETHASONE 0.5 MG/5ML
6 ELIXIR ORAL EVERY 12 HOURS
Refills: 0 | Status: DISCONTINUED | OUTPATIENT
Start: 2021-05-07 | End: 2021-05-07

## 2021-05-07 RX ORDER — TOCILIZUMAB 20 MG/ML
800 INJECTION, SOLUTION, CONCENTRATE INTRAVENOUS ONCE
Refills: 0 | Status: DISCONTINUED | OUTPATIENT
Start: 2021-05-07 | End: 2021-05-07

## 2021-05-07 RX ORDER — ALPRAZOLAM 0.25 MG
0.25 TABLET ORAL ONCE
Refills: 0 | Status: DISCONTINUED | OUTPATIENT
Start: 2021-05-07 | End: 2021-05-07

## 2021-05-07 RX ORDER — TOCILIZUMAB 20 MG/ML
800 INJECTION, SOLUTION, CONCENTRATE INTRAVENOUS ONCE
Refills: 0 | Status: COMPLETED | OUTPATIENT
Start: 2021-05-07 | End: 2021-05-07

## 2021-05-07 RX ORDER — PANTOPRAZOLE SODIUM 20 MG/1
40 TABLET, DELAYED RELEASE ORAL
Refills: 0 | Status: DISCONTINUED | OUTPATIENT
Start: 2021-05-07 | End: 2021-05-13

## 2021-05-07 RX ADMIN — CHLORHEXIDINE GLUCONATE 1 APPLICATION(S): 213 SOLUTION TOPICAL at 06:27

## 2021-05-07 RX ADMIN — Medication 101.6 MILLIGRAM(S): at 15:10

## 2021-05-07 RX ADMIN — REMDESIVIR 500 MILLIGRAM(S): 5 INJECTION INTRAVENOUS at 06:26

## 2021-05-07 RX ADMIN — Medication 30 MILLILITER(S): at 19:59

## 2021-05-07 RX ADMIN — CEFTRIAXONE 100 MILLIGRAM(S): 500 INJECTION, POWDER, FOR SOLUTION INTRAMUSCULAR; INTRAVENOUS at 10:17

## 2021-05-07 RX ADMIN — Medication 6 MILLIGRAM(S): at 06:27

## 2021-05-07 RX ADMIN — Medication 0.25 MILLIGRAM(S): at 00:20

## 2021-05-07 RX ADMIN — TOCILIZUMAB 100 MILLIGRAM(S): 20 INJECTION, SOLUTION, CONCENTRATE INTRAVENOUS at 08:10

## 2021-05-07 RX ADMIN — ENOXAPARIN SODIUM 40 MILLIGRAM(S): 100 INJECTION SUBCUTANEOUS at 06:27

## 2021-05-07 RX ADMIN — Medication 101.6 MILLIGRAM(S): at 22:13

## 2021-05-07 NOTE — PROGRESS NOTE ADULT - SUBJECTIVE AND OBJECTIVE BOX
MICHAEL ARENAS  40y Male    CHIEF COMPLAINT:    Patient is a 40y old  Male who presents with a chief complaint of COVID (07 May 2021 10:50)      INTERVAL HPI/OVERNIGHT EVENTS:    Patient seen and examined. No acute events overnight. remains on HFNC    ROS: All other systems are negative.    Vital Signs:    T(F): 99.1 (05-07-21 @ 12:00), Max: 99.8 (05-07-21 @ 08:00)  HR: 85 (05-07-21 @ 12:00) (73 - 85)  BP: 128/64 (05-07-21 @ 12:00) (128/64 - 150/92)  RR: 20 (05-07-21 @ 12:00) (20 - 20)  SpO2: 92% (05-07-21 @ 12:00) (90% - 95%)    06 May 2021 07:01  -  07 May 2021 07:00  --------------------------------------------------------  IN: 450 mL / OUT: 1050 mL / NET: -600 mL    07 May 2021 07:01  -  07 May 2021 13:00  --------------------------------------------------------  IN: 0 mL / OUT: 450 mL / NET: -450 mL    PHYSICAL EXAM:    GENERAL:  NAD  SKIN: No rashes or lesions  HEENT: Atraumatic. Normocephalic.   NECK: Supple, No JVD.   PULMONARY: poor inspiratory effort. No wheezing. No rales  CVS: Normal S1, S2. Rate and Rhythm are regular.    ABDOMEN/GI: Soft, Nontender, Nondistended   MSK: no clubbing or cyanosis   NEUROLOGIC : moves all extremities  PSYCH: Alert & oriented x 3    Consultant(s) Notes Reviewed:  [x ] YES  [ ] NO  Care Discussed with Consultants/Other Providers [ x] YES  [ ] NO    LABS:                        12.1   6.34  )-----------( 212      ( 07 May 2021 08:37 )             37.6     135  |  98  |  14  ----------------------------<  111<H>  4.0   |  26  |  0.5<L>    Ca    8.3<L>      07 May 2021 08:37  Mg     1.9     05-07    TPro  6.1  /  Alb  3.2<L>  /  TBili  0.3  /  DBili  x   /  AST  71<H>  /  ALT  52<H>  /  AlkPhos  142<H>  05-07    Trop <0.01, CKMB --, CK --, 05-05-21 @ 14:52    RADIOLOGY & ADDITIONAL TESTS:  Imaging or report Personally Reviewed:  [x] YES  [ ] NO  EKG reviewed: [x] YES  [ ] NO    Medications:  Standing  cefTRIAXone   IVPB 2000 milliGRAM(s) IV Intermittent every 24 hours  chlorhexidine 4% Liquid 1 Application(s) Topical <User Schedule>  dexAMETHasone  IVPB 8 milliGRAM(s) IV Intermittent every 8 hours  enoxaparin Injectable 40 milliGRAM(s) SubCutaneous every 12 hours  levoFLOXacin IVPB      levoFLOXacin IVPB 500 milliGRAM(s) IV Intermittent every 24 hours  pantoprazole    Tablet 40 milliGRAM(s) Oral before breakfast  remdesivir  IVPB 100 milliGRAM(s) IV Intermittent every 24 hours  remdesivir  IVPB   IV Intermittent     PRN Meds

## 2021-05-07 NOTE — PROGRESS NOTE ADULT - ASSESSMENT
· Assessment	  41 y/o M with no PMH presents with SOB. Patient woke up May 1 with bad headache. His wife had tested positive for COVID, so he went to get tested and was positive. Today he noticed his SOB was worsening so he went get plasma, while there they noted he was saturating in low 80s so they did not give him plasma and sent him to the hospital.     IMPRESSION;  COVID 19 with severe illness. SpO2 < 94% on RA and need for supplemental O2.   Progressive resp failure . Now on HFNC+NRB. CXR worsening  Ddimers increased  Would expect progression to MV  Pt is in the early viral replicative phase based on the timeline/onset of symptoms.  Inflammatory markers are elevated and suggestive of a cytokine response/cytokine storm.  CXR > 50 % opacities and cannot exclude an underlying bacterial PNA and will recommend treating as such  procalcitonin 2.08  Ferritin 1353    Ddimers 531>2700    RECOMMENDATIONS;  Target SpO2 92 % to 96 %  BCx  Urine for legionella/strep pneumonia antigen  Rocephin 2 gm iv q24h  Levoquin 500 mg iv q24h  Tocilizumab iv 800 mg once. Ferritin 48h later   Day 1 : Single  mg IV loading dose  Day 2-5 : single  mg IV once daily maintenance dose  Daily CBC,CMP.  Dexamethasone 6 mg iv q24h for 10 days.  Monitor for side effects: hyperglycemia, neurological ( agitation/confusion), adrenal suppression, bacterial and fungal infections

## 2021-05-07 NOTE — RESEARCH COMMUNICATION NOTE - NS AS RESEARCH COMMUNICATION NOTE FT
Patient meets eligibility for Hep-Covid trial.   An informed consent was obtained to be kept in the research chart for the patient.   A new order for LOVENOX (study dose) will be placed and the dose will be available from [facility].   If you have any Qs you can call Sana (Research Coordinator) on [phone]. You can also contact Dr Bess ([phone] or via TEAMS).     Case discussed with primary Attending, resident and nursing assigned.

## 2021-05-07 NOTE — PROGRESS NOTE ADULT - ASSESSMENT
IMPRESSION:    Acute hypoxemic respiratory failure   Severe COVID pneumonia  Probable superimposed bacterial pneumonia     PLAN:    CNS: No depressants     HEENT: Oral care    PULMONARY:  HOB @ 45 degrees.  Aspiration precautions.  Continue HFNCo2.  Alternate with NIV.  Wean O2 as tolerated.  Dexa D#3.  Increase to 8 mg Q8.  Encourage incentive spirometry      CARDIOVASCULAR:  Avoid volume overload     GI: GI prophylaxis.  Feeding.  Bowel regimen     RENAL:  Follow up lytes.  Correct as needed    INFECTIOUS DISEASE: Follow up cultures.  Continue ABX. FU Urine Legionella  and Strep Ag.  Toci.  Check COVID antibodies.  Nasal MRSA.      HEMATOLOGICAL:  DVT prophylaxis. LMWH.  LE duplex negative     ENDOCRINE:  Follow up FS.  Insulin protocol if needed.    MUSCULOSKELETAL:  Bed chair position.      CEU     Prognosis guarded     DW ID

## 2021-05-07 NOTE — PROGRESS NOTE ADULT - ASSESSMENT
39 y/o obese Male without significant PMX presented to the ED with worsening SOB and Hypoxia, admitted to SDU with severe COVID PNA  Onset of symptoms 5/1    Acute hypoxemic respiratory failure secondary to COVID 19 PNA  Suspected superimposed bacterial PNA  Obesity  Transaminitis  Currently on HFNC 60/100 saturating 92%  Procal 2.08, Ddimer 531--->2700   LE duplex 5/5 without DVTs. Patient enrolled in lovenox study  Rocephin 2g Q24H and Levaquin 500 Q24H  check blood cx, Urine strep and legionella  on RDV started 5/5 and Dexamethasone 8mg Q8H, started 5/5  s/p Toci 5/7/2021  encourage incentive spirometry   taper down supplemental oxygen as tolerated  trend inflammatory markers  pulm cc following    Patient requires continuous monitoring in CEU .

## 2021-05-07 NOTE — PROGRESS NOTE ADULT - SUBJECTIVE AND OBJECTIVE BOX
MICHAEL ARENAS 40y Male  MRN#: [id number]   CODE STATUS:_____full___      SUBJECTIVE  Patient is a 40y old Male who presents with a chief complaint of COVID (07 May 2021 07:28)  Currently admitted to medicine with the primary diagnosis of COVID-19      Today is hospital day 2d, and this morning he is feeling well and reports no issues overnight. denies shortness of breath at rest. Denies chest pain/leg pain/nausea/vomiting/diarrhea/constipation.           OBJECTIVE  PAST MEDICAL & SURGICAL HISTORY  No pertinent past medical history    No significant past surgical history      ALLERGIES:  No Known Allergies    MEDICATIONS:  STANDING MEDICATIONS  cefTRIAXone   IVPB 2000 milliGRAM(s) IV Intermittent every 24 hours  chlorhexidine 4% Liquid 1 Application(s) Topical <User Schedule>  dexAMETHasone  IVPB 8 milliGRAM(s) IV Intermittent every 8 hours  enoxaparin Injectable 40 milliGRAM(s) SubCutaneous every 12 hours  levoFLOXacin IVPB      levoFLOXacin IVPB 500 milliGRAM(s) IV Intermittent every 24 hours  remdesivir  IVPB 100 milliGRAM(s) IV Intermittent every 24 hours  remdesivir  IVPB   IV Intermittent     PRN MEDICATIONS      VITAL SIGNS: Last 24 Hours  T(C): 37.7 (07 May 2021 08:00), Max: 37.7 (07 May 2021 08:00)  T(F): 99.8 (07 May 2021 08:00), Max: 99.8 (07 May 2021 08:00)  HR: 82 (07 May 2021 08:00) (73 - 85)  BP: 150/92 (07 May 2021 08:00) (128/64 - 150/92)  BP(mean): --  RR: 20 (07 May 2021 08:00) (20 - 20)  SpO2: 93% (07 May 2021 08:00) (89% - 95%)    LABS:                        12.1   6.34  )-----------( 212      ( 07 May 2021 08:37 )             37.6     05-06    138  |  100  |  12  ----------------------------<  144<H>  3.7   |  23  |  0.5<L>    Ca    8.0<L>      06 May 2021 08:28    TPro  5.8<L>  /  Alb  3.1<L>  /  TBili  0.3  /  DBili  x   /  AST  87<H>  /  ALT  67<H>  /  AlkPhos  140<H>  05-06              CARDIAC MARKERS ( 05 May 2021 14:52 )  x     / <0.01 ng/mL / x     / x     / x          RADIOLOGY:    < from: Xray Chest 1 View- PORTABLE-Routine (Xray Chest 1 View- PORTABLE-Routine in AM.) (05.07.21 @ 07:59) >  Impression:    Interval increase in bilateral opacities.    < end of copied text >    < from: [facility] Duplex Lower Ext Vein Scan, Bilat (05.05.21 @ 19:58) >  Impression:    No evidence of deep venous thrombosis or superficial thrombophlebitis in the bilateral lower extremities.    < end of copied text >    PHYSICAL EXAM:    GENERAL: NAD, obese, AAOx3  HEENT:  Atraumatic, Normocephalic. EOMI, PERRLA,   PULMONARY: bilateral crackles L>R at base.   CARDIOVASCULAR: Regular rate and rhythm; No murmurs  GASTROINTESTINAL: Soft, Nontender, Nondistended;   MUSCULOSKELETAL:  2+ Peripheral Pulses, No clubbing, cyanosis, or edema  NEUROLOGY: non-focal  SKIN: No rashes or lesions      ADMISSION SUMMARY  Patient is a 40y old Male who presents with a chief complaint of COVID (07 May 2021 07:28)  Currently admitted to medicine with the primary diagnosis of COVID-19    Hospital course has been complicated by _______.       ASSESSMENT & PLAN  39 y/o no significant past medical history presenting for shortness of breath since 5/3 (tested positive for COVID outpatient 5/1? after he had headache), admitted for severe COVID PNA requiring HFNC to maintain O2 saturation.     #Severe COVID PNA with suspected superimposed bacterial infection   -f/u urine strep/legionella (pending), blood culture (pending)   -, ferritin 1353, procalcitonin 2.08, d-dimer 531.   -Lovenox 40mg BID as BMI>30 with COVID   -ID followup - too early for toci - plan to give day 10, currently he is around day 5. abx changed: ceftriaxone 2g q24hr and levaquin 500mg q24hr  -CXR with extensive bilateral opacities  - c/w remdesivir (day 1 was 5/5), dexamethasone 6mg q24 (day 1 was 5/5)  - Wean O2 as tolerated , HFNC 60/100/ bipap overnight.   -f/u pulm - plan to increase dexamethasoen to 8mg q8 hrs.   -LE duplex 5/5 negative for dvt       #Diet - regular   #GI ppx - pantoprazole  #DVT ppx - lovenox 40 BID   #Code status - full

## 2021-05-07 NOTE — PROGRESS NOTE ADULT - SUBJECTIVE AND OBJECTIVE BOX
MICHAEL ARENAS  40y, Male    All available historical data reviewed    OVERNIGHT EVENTS:  no fevers  HFNC+NRB  no cough    ROS:  General: Denies rigors, nightsweats  HEENT: Denies headache, rhinorrhea, sore throat, eye pain  CV: Denies CP, palpitations  PULM: Denies wheezing, hemoptysis  GI: Denies hematemesis, hematochezia, melena  : Denies discharge, hematuria  MSK: Denies arthralgias, myalgias  SKIN: Denies rash, lesions  NEURO: Denies paresthesias, weakness  PSYCH: Denies depression, anxiety    VITALS:  T(F): 99.8, Max: 99.8 (05-07-21 @ 08:00)  HR: 82  BP: 150/92  RR: 20Vital Signs Last 24 Hrs  T(C): 37.7 (07 May 2021 08:00), Max: 37.7 (07 May 2021 08:00)  T(F): 99.8 (07 May 2021 08:00), Max: 99.8 (07 May 2021 08:00)  HR: 82 (07 May 2021 08:00) (73 - 85)  BP: 150/92 (07 May 2021 08:00) (128/64 - 150/92)  BP(mean): --  RR: 20 (07 May 2021 08:00) (20 - 20)  SpO2: 93% (07 May 2021 08:00) (89% - 95%)    TESTS & MEASUREMENTS:                        12.1   6.34  )-----------( 212      ( 07 May 2021 08:37 )             37.6     05-07    135  |  98  |  14  ----------------------------<  111<H>  4.0   |  26  |  0.5<L>    Ca    8.3<L>      07 May 2021 08:37  Mg     1.9     05-07    TPro  6.1  /  Alb  3.2<L>  /  TBili  0.3  /  DBili  x   /  AST  71<H>  /  ALT  52<H>  /  AlkPhos  142<H>  05-07    LIVER FUNCTIONS - ( 07 May 2021 08:37 )  Alb: 3.2 g/dL / Pro: 6.1 g/dL / ALK PHOS: 142 U/L / ALT: 52 U/L / AST: 71 U/L / GGT: x                   RADIOLOGY & ADDITIONAL TESTS:  Personal review of radiological diagnostics performed  Echo and EKG results noted when applicable.     MEDICATIONS:  cefTRIAXone   IVPB 2000 milliGRAM(s) IV Intermittent every 24 hours  chlorhexidine 4% Liquid 1 Application(s) Topical <User Schedule>  dexAMETHasone  IVPB 8 milliGRAM(s) IV Intermittent every 8 hours  enoxaparin Injectable 40 milliGRAM(s) SubCutaneous every 12 hours  levoFLOXacin IVPB      levoFLOXacin IVPB 500 milliGRAM(s) IV Intermittent every 24 hours  remdesivir  IVPB 100 milliGRAM(s) IV Intermittent every 24 hours  remdesivir  IVPB   IV Intermittent       ANTIBIOTICS:  cefTRIAXone   IVPB 2000 milliGRAM(s) IV Intermittent every 24 hours  levoFLOXacin IVPB      levoFLOXacin IVPB 500 milliGRAM(s) IV Intermittent every 24 hours  remdesivir  IVPB 100 milliGRAM(s) IV Intermittent every 24 hours  remdesivir  IVPB   IV Intermittent

## 2021-05-07 NOTE — PROGRESS NOTE ADULT - SUBJECTIVE AND OBJECTIVE BOX
Patient is a 40y old  Male who presents with a chief complaint of COVID (06 May 2021 10:31)        Over Night Events:  Feels worse today.  on HFNCO2.  off pressors         ROS:     All ROS are negative except HPI         PHYSICAL EXAM    ICU Vital Signs Last 24 Hrs  T(C): 37.1 (07 May 2021 04:00), Max: 37.4 (07 May 2021 00:00)  T(F): 98.8 (07 May 2021 04:00), Max: 99.3 (07 May 2021 00:00)  HR: 81 (07 May 2021 06:00) (73 - 85)  BP: 138/68 (07 May 2021 06:00) (122/73 - 148/75)  BP(mean): --  ABP: --  ABP(mean): --  RR: 20 (07 May 2021 06:00) (20 - 20)  SpO2: 93% (07 May 2021 06:00) (89% - 95%)      CONSTITUTIONAL:  Well nourished.  NAD    ENT:   Airway patent,   Mouth with normal mucosa.   No thrush    EYES:   Pupils equal,   Round and reactive to light.    CARDIAC:   Normal rate,   Regular rhythm.    No edema      Vascular:  Normal systolic impulse  No Carotid bruits    RESPIRATORY:   No wheezing  Bilateral BS  Normal chest expansion  Slightly tachypneic,  No use of accessory muscles    GASTROINTESTINAL:  Abdomen soft,   Non-tender,   No guarding,   + BS    MUSCULOSKELETAL:   Range of motion is not limited,  No clubbing, cyanosis    NEUROLOGICAL:   Alert and oriented   No motor  deficits.    SKIN:   Skin normal color for race,   Warm and dry and intact.   No evidence of rash.    PSYCHIATRIC:   Normal mood and affect.   No apparent risk to self or others.    HEMATOLOGICAL:  No cervical  lymphadenopathy.  no inguinal lymphadenopathy      05-06-21 @ 07:01  -  05-07-21 @ 07:00  --------------------------------------------------------  IN:    IV PiggyBack: 250 mL    Oral Fluid: 200 mL  Total IN: 450 mL    OUT:    Voided (mL): 1050 mL  Total OUT: 1050 mL    Total NET: -600 mL          LABS:                            12.3   5.44  )-----------( 181      ( 06 May 2021 08:28 )             37.5                                               05-06    138  |  100  |  12  ----------------------------<  144<H>  3.7   |  23  |  0.5<L>    Ca    8.0<L>      06 May 2021 08:28    TPro  5.8<L>  /  Alb  3.1<L>  /  TBili  0.3  /  DBili  x   /  AST  87<H>  /  ALT  67<H>  /  AlkPhos  140<H>  05-06                                                 CARDIAC MARKERS ( 05 May 2021 14:52 )  x     / <0.01 ng/mL / x     / x     / x                                                LIVER FUNCTIONS - ( 06 May 2021 08:28 )  Alb: 3.1 g/dL / Pro: 5.8 g/dL / ALK PHOS: 140 U/L / ALT: 67 U/L / AST: 87 U/L / GGT: x                                                                                                                                       MEDICATIONS  (STANDING):  cefTRIAXone   IVPB 2000 milliGRAM(s) IV Intermittent every 24 hours  chlorhexidine 4% Liquid 1 Application(s) Topical <User Schedule>  dexAMETHasone  Injectable 6 milliGRAM(s) IV Push daily  enoxaparin Injectable 40 milliGRAM(s) SubCutaneous every 12 hours  levoFLOXacin IVPB      levoFLOXacin IVPB 500 milliGRAM(s) IV Intermittent every 24 hours  remdesivir  IVPB 100 milliGRAM(s) IV Intermittent every 24 hours  remdesivir  IVPB   IV Intermittent   tocilizumab IVPB 800 milliGRAM(s) IV Intermittent once    MEDICATIONS  (PRN):      New X-rays reviewed:                                                                                  ECHO    CXR interpreted by me:  Worsening infiltrates

## 2021-05-08 LAB
ALBUMIN SERPL ELPH-MCNC: 3.4 G/DL — LOW (ref 3.5–5.2)
ALP SERPL-CCNC: 127 U/L — HIGH (ref 30–115)
ALT FLD-CCNC: 46 U/L — HIGH (ref 0–41)
ANION GAP SERPL CALC-SCNC: 16 MMOL/L — HIGH (ref 7–14)
AST SERPL-CCNC: 53 U/L — HIGH (ref 0–41)
BASOPHILS # BLD AUTO: 0.04 K/UL — SIGNIFICANT CHANGE UP (ref 0–0.2)
BASOPHILS NFR BLD AUTO: 0.7 % — SIGNIFICANT CHANGE UP (ref 0–1)
BILIRUB SERPL-MCNC: 0.4 MG/DL — SIGNIFICANT CHANGE UP (ref 0.2–1.2)
BUN SERPL-MCNC: 13 MG/DL — SIGNIFICANT CHANGE UP (ref 10–20)
CALCIUM SERPL-MCNC: 8.6 MG/DL — SIGNIFICANT CHANGE UP (ref 8.5–10.1)
CHLORIDE SERPL-SCNC: 97 MMOL/L — LOW (ref 98–110)
CO2 SERPL-SCNC: 24 MMOL/L — SIGNIFICANT CHANGE UP (ref 17–32)
CREAT SERPL-MCNC: 0.5 MG/DL — LOW (ref 0.7–1.5)
CRP SERPL-MCNC: 139 MG/L — HIGH
D DIMER BLD IA.RAPID-MCNC: 2549 NG/ML DDU — HIGH (ref 0–230)
D DIMER BLD IA.RAPID-MCNC: 3087 NG/ML DDU — HIGH (ref 0–230)
EOSINOPHIL # BLD AUTO: 0 K/UL — SIGNIFICANT CHANGE UP (ref 0–0.7)
EOSINOPHIL NFR BLD AUTO: 0 % — SIGNIFICANT CHANGE UP (ref 0–8)
GLUCOSE SERPL-MCNC: 144 MG/DL — HIGH (ref 70–99)
HCT VFR BLD CALC: 36.6 % — LOW (ref 42–52)
HGB BLD-MCNC: 12 G/DL — LOW (ref 14–18)
IMM GRANULOCYTES NFR BLD AUTO: 6.5 % — HIGH (ref 0.1–0.3)
LYMPHOCYTES # BLD AUTO: 0.81 K/UL — LOW (ref 1.2–3.4)
LYMPHOCYTES # BLD AUTO: 13.5 % — LOW (ref 20.5–51.1)
MAGNESIUM SERPL-MCNC: 2.2 MG/DL — SIGNIFICANT CHANGE UP (ref 1.8–2.4)
MCHC RBC-ENTMCNC: 26.4 PG — LOW (ref 27–31)
MCHC RBC-ENTMCNC: 32.8 G/DL — SIGNIFICANT CHANGE UP (ref 32–37)
MCV RBC AUTO: 80.6 FL — SIGNIFICANT CHANGE UP (ref 80–94)
MONOCYTES # BLD AUTO: 0.36 K/UL — SIGNIFICANT CHANGE UP (ref 0.1–0.6)
MONOCYTES NFR BLD AUTO: 6 % — SIGNIFICANT CHANGE UP (ref 1.7–9.3)
NEUTROPHILS # BLD AUTO: 4.41 K/UL — SIGNIFICANT CHANGE UP (ref 1.4–6.5)
NEUTROPHILS NFR BLD AUTO: 73.3 % — SIGNIFICANT CHANGE UP (ref 42.2–75.2)
NRBC # BLD: 0 /100 WBCS — SIGNIFICANT CHANGE UP (ref 0–0)
PLATELET # BLD AUTO: 227 K/UL — SIGNIFICANT CHANGE UP (ref 130–400)
POTASSIUM SERPL-MCNC: 4.2 MMOL/L — SIGNIFICANT CHANGE UP (ref 3.5–5)
POTASSIUM SERPL-SCNC: 4.2 MMOL/L — SIGNIFICANT CHANGE UP (ref 3.5–5)
PROT SERPL-MCNC: 6.4 G/DL — SIGNIFICANT CHANGE UP (ref 6–8)
RBC # BLD: 4.54 M/UL — LOW (ref 4.7–6.1)
RBC # FLD: 12.5 % — SIGNIFICANT CHANGE UP (ref 11.5–14.5)
SODIUM SERPL-SCNC: 137 MMOL/L — SIGNIFICANT CHANGE UP (ref 135–146)
WBC # BLD: 6.01 K/UL — SIGNIFICANT CHANGE UP (ref 4.8–10.8)
WBC # FLD AUTO: 6.01 K/UL — SIGNIFICANT CHANGE UP (ref 4.8–10.8)

## 2021-05-08 PROCEDURE — 99232 SBSQ HOSP IP/OBS MODERATE 35: CPT

## 2021-05-08 PROCEDURE — 93970 EXTREMITY STUDY: CPT | Mod: 26

## 2021-05-08 PROCEDURE — 99233 SBSQ HOSP IP/OBS HIGH 50: CPT

## 2021-05-08 PROCEDURE — 71045 X-RAY EXAM CHEST 1 VIEW: CPT | Mod: 26

## 2021-05-08 RX ADMIN — CEFTRIAXONE 100 MILLIGRAM(S): 500 INJECTION, POWDER, FOR SOLUTION INTRAMUSCULAR; INTRAVENOUS at 10:22

## 2021-05-08 RX ADMIN — Medication 101.6 MILLIGRAM(S): at 22:33

## 2021-05-08 RX ADMIN — PANTOPRAZOLE SODIUM 40 MILLIGRAM(S): 20 TABLET, DELAYED RELEASE ORAL at 06:31

## 2021-05-08 RX ADMIN — Medication 101.6 MILLIGRAM(S): at 15:00

## 2021-05-08 RX ADMIN — REMDESIVIR 500 MILLIGRAM(S): 5 INJECTION INTRAVENOUS at 06:59

## 2021-05-08 RX ADMIN — Medication 30 MILLILITER(S): at 03:57

## 2021-05-08 RX ADMIN — Medication 101.6 MILLIGRAM(S): at 06:31

## 2021-05-08 NOTE — PROGRESS NOTE ADULT - ASSESSMENT
41 y/o obese Male without significant PMX presented to the ED with worsening SOB and Hypoxia, admitted to SDU with severe COVID PNA  Onset of symptoms 5/1    Acute hypoxemic respiratory failure secondary to COVID 19 PNA  Suspected superimposed bacterial PNA  Obesity  Transaminitis  Currently on HFNC 60/100 saturating 92%  Procal 2.08, Ddimer 531--->2700--->2549   LE duplex 5/5 without DVTs. Patient enrolled in lovenox study  Rocephin 2g Q24H and Levaquin 500 Q24H  check blood cx, Urine strep and legionella  on RDV started 5/5 and Dexamethasone 8mg Q8H, started 5/5  s/p Toci 5/7/2021  encourage incentive spirometry   taper down supplemental oxygen as tolerated  trend inflammatory markers  Repeat LE duplex  pulm cc following    Patient requires continuous monitoring in CEU .

## 2021-05-08 NOTE — PROGRESS NOTE ADULT - ASSESSMENT
IMPRESSION:    Acute hypoxemic respiratory failure   Severe COVID pneumonia SP TOCI   Probable superimposed bacterial pneumonia     PLAN:    CNS: No depressants     HEENT: Oral care    PULMONARY:  HOB @ 45 degrees.  Aspiration precautions.  Continue HFNCo2.  Wean O2 as tolerated.  David MURRAY#4.  Encourage incentive spirometry and NIV during sleep      CARDIOVASCULAR:  Avoid volume overload     GI: GI prophylaxis.  Feeding.  Bowel regimen     RENAL:  Follow up lytes.  Correct as needed    INFECTIOUS DISEASE: Follow up cultures.  Continue ABX. FU Urine Legionella  and Strep Ag.  Toci.  Check COVID antibodies.  Nasal MRSA.      HEMATOLOGICAL:  DVT prophylaxis. LMWH therapeutic.  Repeat LE duplex.    ENDOCRINE:  Follow up FS.  Insulin protocol if needed.    MUSCULOSKELETAL:  OOB to chair      CEU     Prognosis guarded

## 2021-05-08 NOTE — PROGRESS NOTE ADULT - SUBJECTIVE AND OBJECTIVE BOX
Patient is a 40y old  Male who presents with a chief complaint of COVID (07 May 2021 13:00)        Over Night Events:  Looks and feels bettr.  remains on HFNCO2.  Off pressors         ROS:     All ROS are negative except HPI         PHYSICAL EXAM    ICU Vital Signs Last 24 Hrs  T(C): 37.1 (08 May 2021 05:48), Max: 37.7 (07 May 2021 08:00)  T(F): 98.7 (08 May 2021 05:48), Max: 99.8 (07 May 2021 08:00)  HR: 77 (08 May 2021 05:48) (77 - 85)  BP: 116/61 (08 May 2021 05:48) (116/61 - 150/92)  BP(mean): --  ABP: --  ABP(mean): --  RR: 20 (08 May 2021 05:48) (20 - 20)  SpO2: 96% (08 May 2021 05:48) (92% - 97%)      CONSTITUTIONAL:  Well nourished.  NAD    ENT:   Airway patent,   Mouth with normal mucosa.   No thrush    EYES:   Pupils equal,   Round and reactive to light.    CARDIAC:   Normal rate,   Regular rhythm.    No edema      Vascular:  Normal systolic impulse  No Carotid bruits    RESPIRATORY:   No wheezing  Bilateral crackles   Normal chest expansion  Not tachypneic,  No use of accessory muscles    GASTROINTESTINAL:  Abdomen soft,   Non-tender,   No guarding,   + BS    MUSCULOSKELETAL:   Range of motion is not limited,  No clubbing, cyanosis    NEUROLOGICAL:   Alert and oriented   No motor  deficits.    SKIN:   Skin normal color for race,   Warm and dry and intact.   No evidence of rash.    PSYCHIATRIC:   Normal mood and affect.   No apparent risk to self or others.    HEMATOLOGICAL:  No cervical  lymphadenopathy.  no inguinal lymphadenopathy      05-06-21 @ 07:01  -  05-07-21 @ 07:00  --------------------------------------------------------  IN:    IV PiggyBack: 250 mL    Oral Fluid: 200 mL  Total IN: 450 mL    OUT:    Voided (mL): 1050 mL  Total OUT: 1050 mL    Total NET: -600 mL      05-07-21 @ 07:01 - 05-08-21 @ 06:32  --------------------------------------------------------  IN:  Total IN: 0 mL    OUT:    Voided (mL): 900 mL  Total OUT: 900 mL    Total NET: -900 mL          LABS:                            12.1   6.34  )-----------( 212      ( 07 May 2021 08:37 )             37.6                                               05-07    135  |  98  |  14  ----------------------------<  111<H>  4.0   |  26  |  0.5<L>    Ca    8.3<L>      07 May 2021 08:37  Mg     1.9     05-07    TPro  6.1  /  Alb  3.2<L>  /  TBili  0.3  /  DBili  x   /  AST  71<H>  /  ALT  52<H>  /  AlkPhos  142<H>  05-07      PT/INR - ( 07 May 2021 18:22 )   PT: 13.00 sec;   INR: 1.13 ratio                                                                                              LIVER FUNCTIONS - ( 07 May 2021 08:37 )  Alb: 3.2 g/dL / Pro: 6.1 g/dL / ALK PHOS: 142 U/L / ALT: 52 U/L / AST: 71 U/L / GGT: x                                                  Culture - Blood (collected 06 May 2021 08:28)  Source: .Blood None  Preliminary Report (07 May 2021 18:01):    No growth to date.                                                                                           MEDICATIONS  (STANDING):  cefTRIAXone   IVPB 2000 milliGRAM(s) IV Intermittent every 24 hours  chlorhexidine 4% Liquid 1 Application(s) Topical <User Schedule>  dexAMETHasone  IVPB 8 milliGRAM(s) IV Intermittent every 8 hours  enoxaparin Study Injectable () 1 Dose(s) SubCutaneous two times a day  levoFLOXacin IVPB      levoFLOXacin IVPB 500 milliGRAM(s) IV Intermittent every 24 hours  pantoprazole    Tablet 40 milliGRAM(s) Oral before breakfast  remdesivir  IVPB 100 milliGRAM(s) IV Intermittent every 24 hours  remdesivir  IVPB   IV Intermittent     MEDICATIONS  (PRN):  aluminum hydroxide/magnesium hydroxide/simethicone Suspension 30 milliLiter(s) Oral every 4 hours PRN Dyspepsia      New X-rays reviewed:                                                                                  ECHO    CXR interpreted by me:   bilateral infiltrates

## 2021-05-08 NOTE — PROGRESS NOTE ADULT - SUBJECTIVE AND OBJECTIVE BOX
MICHAEL ARENAS  40y Male    CHIEF COMPLAINT:    Patient is a 40y old  Male who presents with a chief complaint of COVID (08 May 2021 06:32)      INTERVAL HPI/OVERNIGHT EVENTS:    Patient seen and examined. No acute events overnight. Remains on HFNC    ROS: All other systems are negative.    Vital Signs:    T(F): 98 (05-08-21 @ 07:53), Max: 99.1 (05-08-21 @ 00:13)  HR: 79 (05-08-21 @ 07:53) (77 - 80)  BP: 130/78 (05-08-21 @ 07:53) (116/61 - 130/78)  RR: 18 (05-08-21 @ 07:53) (18 - 20)  SpO2: 94% (05-08-21 @ 07:53) (92% - 97%)    07 May 2021 07:01  -  08 May 2021 07:00  --------------------------------------------------------  IN: 0 mL / OUT: 900 mL / NET: -900 mL    PHYSICAL EXAM:    GENERAL:  NAD  SKIN: No rashes or lesions  HEENT: Atraumatic. Normocephalic.    NECK: Supple, No JVD.   PULMONARY: CTA B/L. No wheezing. No rales  CVS: Normal S1, S2. Rate and Rhythm are regular.    ABDOMEN/GI: Soft, Nontender, Nondistended  MSK:  No edema B/L LE. No clubbing or cyanosis  NEUROLOGIC:  moves all extremities  PSYCH: Alert & oriented x 3, normal affect    Consultant(s) Notes Reviewed:  [x ] YES  [ ] NO  Care Discussed with Consultants/Other Providers [ x] YES  [ ] NO    LABS:                        12.0   6.01  )-----------( 227      ( 08 May 2021 08:53 )             36.6     137  |  97<L>  |  13  ----------------------------<  144<H>  4.2   |  24  |  0.5<L>    Ca    8.6      08 May 2021 08:53  Mg     2.2     05-08    TPro  6.4  /  Alb  3.4<L>  /  TBili  0.4  /  DBili  x   /  AST  53<H>  /  ALT  46<H>  /  AlkPhos  127<H>  05-08    PT/INR - ( 07 May 2021 18:22 )   PT: 13.00 sec;   INR: 1.13 ratio      Trop <0.01, CKMB --, CK --, 05-05-21 @ 14:52    Culture - Blood (collected 06 May 2021 08:28)  Source: .Blood None  Preliminary Report (07 May 2021 18:01):    No growth to date.    RADIOLOGY & ADDITIONAL TESTS:  < from: Xray Chest 1 View- PORTABLE-Routine (Xray Chest 1 View- PORTABLE-Routine in AM.) (05.08.21 @ 06:30) >  Impression:    Bilateral opacities, unchanged.    < end of copied text >    Imaging or report Personally Reviewed:  [x] YES  [ ] NO  EKG reviewed: [x] YES  [ ] NO    Medications:  Standing  cefTRIAXone   IVPB 2000 milliGRAM(s) IV Intermittent every 24 hours  chlorhexidine 4% Liquid 1 Application(s) Topical <User Schedule>  dexAMETHasone  IVPB 8 milliGRAM(s) IV Intermittent every 8 hours  enoxaparin Study Injectable () 1 Dose(s) SubCutaneous two times a day  levoFLOXacin IVPB      levoFLOXacin IVPB 500 milliGRAM(s) IV Intermittent every 24 hours  pantoprazole    Tablet 40 milliGRAM(s) Oral before breakfast  remdesivir  IVPB 100 milliGRAM(s) IV Intermittent every 24 hours  remdesivir  IVPB   IV Intermittent     PRN Meds  aluminum hydroxide/magnesium hydroxide/simethicone Suspension 30 milliLiter(s) Oral every 4 hours PRN

## 2021-05-09 LAB
ALBUMIN SERPL ELPH-MCNC: 3.4 G/DL — LOW (ref 3.5–5.2)
ALP SERPL-CCNC: 126 U/L — HIGH (ref 30–115)
ALT FLD-CCNC: 48 U/L — HIGH (ref 0–41)
ANION GAP SERPL CALC-SCNC: 14 MMOL/L — SIGNIFICANT CHANGE UP (ref 7–14)
AST SERPL-CCNC: 44 U/L — HIGH (ref 0–41)
BASOPHILS # BLD AUTO: 0.03 K/UL — SIGNIFICANT CHANGE UP (ref 0–0.2)
BASOPHILS NFR BLD AUTO: 0.4 % — SIGNIFICANT CHANGE UP (ref 0–1)
BILIRUB SERPL-MCNC: 0.4 MG/DL — SIGNIFICANT CHANGE UP (ref 0.2–1.2)
BUN SERPL-MCNC: 14 MG/DL — SIGNIFICANT CHANGE UP (ref 10–20)
CALCIUM SERPL-MCNC: 8.4 MG/DL — LOW (ref 8.5–10.1)
CHLORIDE SERPL-SCNC: 97 MMOL/L — LOW (ref 98–110)
CO2 SERPL-SCNC: 24 MMOL/L — SIGNIFICANT CHANGE UP (ref 17–32)
CREAT SERPL-MCNC: 0.6 MG/DL — LOW (ref 0.7–1.5)
CRP SERPL-MCNC: 69 MG/L — HIGH
EOSINOPHIL # BLD AUTO: 0 K/UL — SIGNIFICANT CHANGE UP (ref 0–0.7)
EOSINOPHIL NFR BLD AUTO: 0 % — SIGNIFICANT CHANGE UP (ref 0–8)
FERRITIN SERPL-MCNC: 751 NG/ML — HIGH (ref 30–400)
GLUCOSE SERPL-MCNC: 140 MG/DL — HIGH (ref 70–99)
HCT VFR BLD CALC: 35.3 % — LOW (ref 42–52)
HGB BLD-MCNC: 12 G/DL — LOW (ref 14–18)
IMM GRANULOCYTES NFR BLD AUTO: 5.8 % — HIGH (ref 0.1–0.3)
LEGIONELLA AG UR QL: NEGATIVE — SIGNIFICANT CHANGE UP
LYMPHOCYTES # BLD AUTO: 0.79 K/UL — LOW (ref 1.2–3.4)
LYMPHOCYTES # BLD AUTO: 10.6 % — LOW (ref 20.5–51.1)
MAGNESIUM SERPL-MCNC: 2.1 MG/DL — SIGNIFICANT CHANGE UP (ref 1.8–2.4)
MCHC RBC-ENTMCNC: 27.1 PG — SIGNIFICANT CHANGE UP (ref 27–31)
MCHC RBC-ENTMCNC: 34 G/DL — SIGNIFICANT CHANGE UP (ref 32–37)
MCV RBC AUTO: 79.7 FL — LOW (ref 80–94)
MONOCYTES # BLD AUTO: 0.44 K/UL — SIGNIFICANT CHANGE UP (ref 0.1–0.6)
MONOCYTES NFR BLD AUTO: 5.9 % — SIGNIFICANT CHANGE UP (ref 1.7–9.3)
NEUTROPHILS # BLD AUTO: 5.77 K/UL — SIGNIFICANT CHANGE UP (ref 1.4–6.5)
NEUTROPHILS NFR BLD AUTO: 77.3 % — HIGH (ref 42.2–75.2)
NRBC # BLD: 0 /100 WBCS — SIGNIFICANT CHANGE UP (ref 0–0)
PLATELET # BLD AUTO: 264 K/UL — SIGNIFICANT CHANGE UP (ref 130–400)
POTASSIUM SERPL-MCNC: 4.1 MMOL/L — SIGNIFICANT CHANGE UP (ref 3.5–5)
POTASSIUM SERPL-SCNC: 4.1 MMOL/L — SIGNIFICANT CHANGE UP (ref 3.5–5)
PROCALCITONIN SERPL-MCNC: 0.37 NG/ML — HIGH (ref 0.02–0.1)
PROT SERPL-MCNC: 6.2 G/DL — SIGNIFICANT CHANGE UP (ref 6–8)
RBC # BLD: 4.43 M/UL — LOW (ref 4.7–6.1)
RBC # FLD: 12.3 % — SIGNIFICANT CHANGE UP (ref 11.5–14.5)
SODIUM SERPL-SCNC: 135 MMOL/L — SIGNIFICANT CHANGE UP (ref 135–146)
WBC # BLD: 7.46 K/UL — SIGNIFICANT CHANGE UP (ref 4.8–10.8)
WBC # FLD AUTO: 7.46 K/UL — SIGNIFICANT CHANGE UP (ref 4.8–10.8)

## 2021-05-09 PROCEDURE — 71045 X-RAY EXAM CHEST 1 VIEW: CPT | Mod: 26

## 2021-05-09 PROCEDURE — 99233 SBSQ HOSP IP/OBS HIGH 50: CPT

## 2021-05-09 PROCEDURE — 99232 SBSQ HOSP IP/OBS MODERATE 35: CPT

## 2021-05-09 RX ADMIN — Medication 101.6 MILLIGRAM(S): at 06:08

## 2021-05-09 RX ADMIN — PANTOPRAZOLE SODIUM 40 MILLIGRAM(S): 20 TABLET, DELAYED RELEASE ORAL at 09:17

## 2021-05-09 RX ADMIN — REMDESIVIR 500 MILLIGRAM(S): 5 INJECTION INTRAVENOUS at 06:10

## 2021-05-09 RX ADMIN — CHLORHEXIDINE GLUCONATE 1 APPLICATION(S): 213 SOLUTION TOPICAL at 06:08

## 2021-05-09 RX ADMIN — Medication 30 MILLILITER(S): at 09:17

## 2021-05-09 RX ADMIN — CEFTRIAXONE 100 MILLIGRAM(S): 500 INJECTION, POWDER, FOR SOLUTION INTRAMUSCULAR; INTRAVENOUS at 11:18

## 2021-05-09 RX ADMIN — Medication 101.6 MILLIGRAM(S): at 21:17

## 2021-05-09 RX ADMIN — Medication 101.6 MILLIGRAM(S): at 13:37

## 2021-05-09 NOTE — RESEARCH COMMUNICATION NOTE - NS AS RESEARCH COMMUNICATION NOTE FT
This is a reminder that patient is currently enrolled in the Hep-COVID clinical trial (prophylactic Vs therapeutic Lovenox) and is receiving a blinded dose.     No events related to the study drug reported.     Primary team awaiting results of repeat LE duplex to r/o DVT    For any issue please reach out to the research department (Sana [phone] or Teams) and Dr Bess ([phone] or Teams).

## 2021-05-09 NOTE — PROGRESS NOTE ADULT - ASSESSMENT
41 y/o obese Male without significant PMX presented to the ED with worsening SOB and Hypoxia, admitted to SDU with severe COVID PNA  Onset of symptoms 5/1    Acute hypoxemic respiratory failure secondary to COVID 19 PNA  Suspected superimposed bacterial PNA  Obesity  Transaminitis  Currently on HFNC 60/70 saturating 98%  on RDV started 5/5 and Dexamethasone 8mg Q8H, started 5/5  s/p Toci 5/7/2021   Ddimer 531--->2700--->2549--->3000   LE duplex 5/5 without DVTs. Patient enrolled in lovenox study  Procal 2.08--->0.37  c/w Rocephin 2g Q24H and Levaquin 500 Q24H  check blood cx, nasal MRSA, Urine strep and legionella  encourage incentive spirometry   taper down supplemental oxygen as tolerated  trend inflammatory markers  Repeat LE duplex pending read  pulm cc following    Patient requires continuous monitoring in CEU .

## 2021-05-09 NOTE — PROGRESS NOTE ADULT - SUBJECTIVE AND OBJECTIVE BOX
MICHAEL ARENAS  40y Male    CHIEF COMPLAINT:    Patient is a 40y old  Male who presents with a chief complaint of COVID (09 May 2021 10:28)      INTERVAL HPI/OVERNIGHT EVENTS:    Patient seen and examined. No acute events overnight. Remains on HFNC    ROS: All other systems are negative.    Vital Signs:    T(F): 98.7 (05-09-21 @ 08:09), Max: 99.1 (05-08-21 @ 16:03)  HR: 77 (05-09-21 @ 08:09) (68 - 77)  BP: 121/68 (05-09-21 @ 08:09) (121/68 - 145/83)  RR: 18 (05-09-21 @ 08:09) (18 - 18)  SpO2: 98% (05-09-21 @ 09:15) (96% - 99%)    08 May 2021 07:01  -  09 May 2021 07:00  --------------------------------------------------------  IN: 0 mL / OUT: 500 mL / NET: -500 mL    PHYSICAL EXAM:    GENERAL:  NAD  SKIN: No rashes or lesions  HEENT: Atraumatic. Normocephalic.   NECK: Supple, No JVD. .  PULMONARY: poor inspiratory effort. No wheezing.   CVS: Normal S1, S2. Rate and Rhythm are regular.   ABDOMEN/GI: Soft, Nontender, Nondistended   MSK:  No edema B/L LE. No clubbing or cyanosis  NEUROLOGIC:  moves all extremities  PSYCH: Alert & oriented x 3     Consultant(s) Notes Reviewed:  [x ] YES  [ ] NO  Care Discussed with Consultants/Other Providers [ x] YES  [ ] NO    LABS:                        12.0   7.46  )-----------( 264      ( 09 May 2021 08:41 )             35.3     135  |  97<L>  |  14  ----------------------------<  140<H>  4.1   |  24  |  0.6<L>    Ca    8.4<L>      09 May 2021 08:41  Mg     2.1     05-09    TPro  6.2  /  Alb  3.4<L>  /  TBili  0.4  /  DBili  x   /  AST  44<H>  /  ALT  48<H>  /  AlkPhos  126<H>  05-09    PT/INR - ( 07 May 2021 18:22 )   PT: 13.00 sec;   INR: 1.13 ratio      RADIOLOGY & ADDITIONAL TESTS:  Imaging or report Personally Reviewed:  [x] YES  [ ] NO  EKG reviewed: [x] YES  [ ] NO    Medications:  Standing  cefTRIAXone   IVPB 2000 milliGRAM(s) IV Intermittent every 24 hours  chlorhexidine 4% Liquid 1 Application(s) Topical <User Schedule>  dexAMETHasone  IVPB 8 milliGRAM(s) IV Intermittent every 8 hours  enoxaparin Study Injectable () 1 Dose(s) SubCutaneous two times a day  levoFLOXacin IVPB      levoFLOXacin IVPB 500 milliGRAM(s) IV Intermittent every 24 hours  pantoprazole    Tablet 40 milliGRAM(s) Oral before breakfast  remdesivir  IVPB 100 milliGRAM(s) IV Intermittent every 24 hours  remdesivir  IVPB   IV Intermittent     PRN Meds  aluminum hydroxide/magnesium hydroxide/simethicone Suspension 30 milliLiter(s) Oral every 4 hours PRN

## 2021-05-09 NOTE — PROGRESS NOTE ADULT - SUBJECTIVE AND OBJECTIVE BOX
Patient is a 40y old  Male who presents with a chief complaint of COVID (08 May 2021 12:54)        Over Night Events:  on HFNCO2 60 liters 80%.  Off pressors         ROS:     All ROS are negative except HPI         PHYSICAL EXAM    ICU Vital Signs Last 24 Hrs  T(C): 36.6 (09 May 2021 06:45), Max: 37.3 (08 May 2021 16:03)  T(F): 97.8 (09 May 2021 06:45), Max: 99.1 (08 May 2021 16:03)  HR: 68 (09 May 2021 06:45) (68 - 79)  BP: 145/83 (09 May 2021 06:45) (124/77 - 145/83)  BP(mean): 106 (08 May 2021 07:53) (106 - 106)  ABP: --  ABP(mean): --  RR: 18 (09 May 2021 06:45) (18 - 18)  SpO2: 96% (09 May 2021 06:45) (94% - 96%)      CONSTITUTIONAL:  Well nourished.  NAD    ENT:   Airway patent,   Mouth with normal mucosa.   No thrush    EYES:   Pupils equal,   Round and reactive to light.    CARDIAC:   Normal rate,   Regular rhythm.    No edema      Vascular:  Normal systolic impulse  No Carotid bruits    RESPIRATORY:   No wheezing  Bilateral BS  Normal chest expansion  Not tachypneic,  No use of accessory muscles    GASTROINTESTINAL:  Abdomen soft,   Non-tender,   No guarding,   + BS    MUSCULOSKELETAL:   Range of motion is not limited,  No clubbing, cyanosis    NEUROLOGICAL:   Alert and oriented   No motor  deficits.    SKIN:   Skin normal color for race,   Warm and dry and intact.   No evidence of rash.    PSYCHIATRIC:   Normal mood and affect.   No apparent risk to self or others.    HEMATOLOGICAL:  No cervical  lymphadenopathy.  no inguinal lymphadenopathy      05-08-21 @ 07:01  -  05-09-21 @ 07:00  --------------------------------------------------------  IN:  Total IN: 0 mL    OUT:    Voided (mL): 500 mL  Total OUT: 500 mL    Total NET: -500 mL          LABS:                            12.0   6.01  )-----------( 227      ( 08 May 2021 08:53 )             36.6                                               05-08    137  |  97<L>  |  13  ----------------------------<  144<H>  4.2   |  24  |  0.5<L>    Ca    8.6      08 May 2021 08:53  Mg     2.2     05-08    TPro  6.4  /  Alb  3.4<L>  /  TBili  0.4  /  DBili  x   /  AST  53<H>  /  ALT  46<H>  /  AlkPhos  127<H>  05-08      PT/INR - ( 07 May 2021 18:22 )   PT: 13.00 sec;   INR: 1.13 ratio                                                                                              LIVER FUNCTIONS - ( 08 May 2021 08:53 )  Alb: 3.4 g/dL / Pro: 6.4 g/dL / ALK PHOS: 127 U/L / ALT: 46 U/L / AST: 53 U/L / GGT: x                                                  Culture - Blood (collected 06 May 2021 08:28)  Source: .Blood None  Preliminary Report (07 May 2021 18:01):    No growth to date.                                                                                           MEDICATIONS  (STANDING):  cefTRIAXone   IVPB 2000 milliGRAM(s) IV Intermittent every 24 hours  chlorhexidine 4% Liquid 1 Application(s) Topical <User Schedule>  dexAMETHasone  IVPB 8 milliGRAM(s) IV Intermittent every 8 hours  enoxaparin Study Injectable () 1 Dose(s) SubCutaneous two times a day  levoFLOXacin IVPB      levoFLOXacin IVPB 500 milliGRAM(s) IV Intermittent every 24 hours  pantoprazole    Tablet 40 milliGRAM(s) Oral before breakfast  remdesivir  IVPB 100 milliGRAM(s) IV Intermittent every 24 hours  remdesivir  IVPB   IV Intermittent     MEDICATIONS  (PRN):  aluminum hydroxide/magnesium hydroxide/simethicone Suspension 30 milliLiter(s) Oral every 4 hours PRN Dyspepsia      New X-rays reviewed:                                                                                  ECHO    CXR interpreted by me:  Vitaliy dexter

## 2021-05-09 NOTE — PROGRESS NOTE ADULT - ASSESSMENT
IMPRESSION:    Acute hypoxemic respiratory failure   Severe COVID pneumonia SP TOCI   Probable superimposed bacterial pneumonia     PLAN:    CNS: No depressants     HEENT: Oral care    PULMONARY:  HOB @ 45 degrees.  Aspiration precautions.  Continue HFNCO2.  Wean O2 as tolerated.  Dexa D#5.  Encourage incentive spirometry and NIV during sleep      CARDIOVASCULAR:  Avoid volume overload     GI: GI prophylaxis.  Feeding.  Bowel regimen     RENAL:  Follow up lytes.  Correct as needed    INFECTIOUS DISEASE: Follow up cultures.  Continue ABX. FU Urine Legionella  and Strep Ag.  Nasal MRSA.      HEMATOLOGICAL:  DVT prophylaxis. LMWH therapeutic. FU Repeat LE duplex.    ENDOCRINE:  Follow up FS.  Insulin protocol if needed.    MUSCULOSKELETAL:  OOB to chair      CEU     Prognosis guarded

## 2021-05-09 NOTE — PROGRESS NOTE ADULT - SUBJECTIVE AND OBJECTIVE BOX
MICHAEL ARENAS 40y Male  MRN#: [id number]   CODE STATUS:____full____      SUBJECTIVE  Patient is a 40y old Male who presents with a chief complaint of COVID (09 May 2021 07:26)  Currently admitted to medicine with the primary diagnosis of COVID-19    Today is hospital day 4d, and this morning he is feeling well and reports improvement from yesterday. He notes some bilateral leg swelling.       OBJECTIVE  PAST MEDICAL & SURGICAL HISTORY  No pertinent past medical history    No significant past surgical history      ALLERGIES:  No Known Allergies    MEDICATIONS:  STANDING MEDICATIONS  cefTRIAXone   IVPB 2000 milliGRAM(s) IV Intermittent every 24 hours  chlorhexidine 4% Liquid 1 Application(s) Topical <User Schedule>  dexAMETHasone  IVPB 8 milliGRAM(s) IV Intermittent every 8 hours  enoxaparin Study Injectable () 1 Dose(s) SubCutaneous two times a day  levoFLOXacin IVPB      levoFLOXacin IVPB 500 milliGRAM(s) IV Intermittent every 24 hours  pantoprazole    Tablet 40 milliGRAM(s) Oral before breakfast  remdesivir  IVPB 100 milliGRAM(s) IV Intermittent every 24 hours  remdesivir  IVPB   IV Intermittent     PRN MEDICATIONS  aluminum hydroxide/magnesium hydroxide/simethicone Suspension 30 milliLiter(s) Oral every 4 hours PRN      VITAL SIGNS: Last 24 Hours  T(C): 37.1 (09 May 2021 08:09), Max: 37.3 (08 May 2021 16:03)  T(F): 98.7 (09 May 2021 08:09), Max: 99.1 (08 May 2021 16:03)  HR: 77 (09 May 2021 08:09) (68 - 77)  BP: 121/68 (09 May 2021 08:09) (121/68 - 145/83)  BP(mean): --  RR: 18 (09 May 2021 08:09) (18 - 18)  SpO2: 98% (09 May 2021 09:15) (96% - 99%)    LABS:                        12.0   7.46  )-----------( 264      ( 09 May 2021 08:41 )             35.3     05-09    135  |  97<L>  |  14  ----------------------------<  140<H>  4.1   |  24  |  0.6<L>    Ca    8.4<L>      09 May 2021 08:41  Mg     2.1     05-09    TPro  6.2  /  Alb  3.4<L>  /  TBili  0.4  /  DBili  x   /  AST  44<H>  /  ALT  48<H>  /  AlkPhos  126<H>  05-09    PT/INR - ( 07 May 2021 18:22 )   PT: 13.00 sec;   INR: 1.13 ratio         RADIOLOGY:  < from: [facility] Duplex Lower Ext Vein Scan, Bilat (05.05.21 @ 19:58) >    Impression:    No evidence of deep venousthrombosis or superficial thrombophlebitis in the bilateral lower extremities.      < end of copied text >    < from: Xray Chest 1 View- PORTABLE-Routine (Xray Chest 1 View- PORTABLE-Routine in AM.) (05.09.21 @ 06:50) >  Impression:    Bilateral opacities, unchanged.    < end of copied text >      PHYSICAL EXAM:    GENERAL: NAD, obese, AAOx3  HEENT:  Atraumatic, Normocephalic. EOMI, PERRLA,   PULMONARY: bilateral crackles L>R at base.   CARDIOVASCULAR: Regular rate and rhythm; No murmurs  GASTROINTESTINAL: Soft, Nontender, Nondistended;   MUSCULOSKELETAL:  2+ Peripheral Pulses, No clubbing, cyanosis, or edema  NEUROLOGY: non-focal  SKIN: No rashes or lesions        ADMISSION SUMMARY  Patient is a 40y old Male who presents with a chief complaint of COVID (09 May 2021 07:26)  Currently admitted to medicine with the primary diagnosis of COVID-19    Hospital course has been complicated by _______.       ASSESSMENT & PLAN  39 y/o no significant past medical history presenting for shortness of breath since 5/3 (tested positive for COVID outpatient 5/1? after he had headache), admitted for severe COVID PNA requiring HFNC to maintain O2 saturation.     #Severe COVID PNA with suspected superimposed bacterial infection   -f/u urine strep/legionella (pending), blood culture negative 5/6  ->139>69, ferritin 1353>751, procalcitonin 2.08>0.37, d-dimer 531>2700>2549>3087.   -Lovenox research trial   -ID followup -  ceftriaxone 2g q24hr and levaquin 500mg q24hr, s/p Toci x1  -CXR with extensive bilateral opacities  - c/w remdesivir (day 1 was 5/5), dexamethasone 6mg q24 (day 1 was 5/5)  - Wean O2 as tolerated , HFNC 60/80 bipap overnight.   -f/u pulm - increased dexamethasone to 8mg q8 hrs.   -LE duplex 5/5 negative for dvt , repeat results pending      #Diet - regular   #GI ppx - pantoprazole  #DVT ppx - lovenox research trial  #Code status - full     Pending: f/u urine strep/legionella, inflammatory markers, wean o2.      MICHAEL ARENAS 40y Male  MRN#: [id number]   CODE STATUS:____full____      SUBJECTIVE  Patient is a 40y old Male who presents with a chief complaint of COVID (09 May 2021 07:26)  Currently admitted to medicine with the primary diagnosis of COVID-19    Today is hospital day 4d, and this morning he is feeling well and reports improvement from yesterday. He notes some bilateral leg swelling.       OBJECTIVE  PAST MEDICAL & SURGICAL HISTORY  No pertinent past medical history    No significant past surgical history      ALLERGIES:  No Known Allergies    MEDICATIONS:  STANDING MEDICATIONS  cefTRIAXone   IVPB 2000 milliGRAM(s) IV Intermittent every 24 hours  chlorhexidine 4% Liquid 1 Application(s) Topical <User Schedule>  dexAMETHasone  IVPB 8 milliGRAM(s) IV Intermittent every 8 hours  enoxaparin Study Injectable () 1 Dose(s) SubCutaneous two times a day  levoFLOXacin IVPB      levoFLOXacin IVPB 500 milliGRAM(s) IV Intermittent every 24 hours  pantoprazole    Tablet 40 milliGRAM(s) Oral before breakfast  remdesivir  IVPB 100 milliGRAM(s) IV Intermittent every 24 hours  remdesivir  IVPB   IV Intermittent     PRN MEDICATIONS  aluminum hydroxide/magnesium hydroxide/simethicone Suspension 30 milliLiter(s) Oral every 4 hours PRN      VITAL SIGNS: Last 24 Hours  T(C): 37.1 (09 May 2021 08:09), Max: 37.3 (08 May 2021 16:03)  T(F): 98.7 (09 May 2021 08:09), Max: 99.1 (08 May 2021 16:03)  HR: 77 (09 May 2021 08:09) (68 - 77)  BP: 121/68 (09 May 2021 08:09) (121/68 - 145/83)  BP(mean): --  RR: 18 (09 May 2021 08:09) (18 - 18)  SpO2: 98% (09 May 2021 09:15) (96% - 99%)    LABS:                        12.0   7.46  )-----------( 264      ( 09 May 2021 08:41 )             35.3     05-09    135  |  97<L>  |  14  ----------------------------<  140<H>  4.1   |  24  |  0.6<L>    Ca    8.4<L>      09 May 2021 08:41  Mg     2.1     05-09    TPro  6.2  /  Alb  3.4<L>  /  TBili  0.4  /  DBili  x   /  AST  44<H>  /  ALT  48<H>  /  AlkPhos  126<H>  05-09    PT/INR - ( 07 May 2021 18:22 )   PT: 13.00 sec;   INR: 1.13 ratio         RADIOLOGY:  < from: VA Duplex Lower Ext Vein Scan, Bilat (05.05.21 @ 19:58) >    Impression:    No evidence of deep venousthrombosis or superficial thrombophlebitis in the bilateral lower extremities.      < end of copied text >    < from: Xray Chest 1 View- PORTABLE-Routine (Xray Chest 1 View- PORTABLE-Routine in AM.) (05.09.21 @ 06:50) >  Impression:    Bilateral opacities, unchanged.    < end of copied text >      PHYSICAL EXAM:    GENERAL: NAD, obese, AAOx3  HEENT:  Atraumatic, Normocephalic. EOMI, PERRLA,   PULMONARY: bilateral crackles L>R at base.   CARDIOVASCULAR: Regular rate and rhythm; No murmurs  GASTROINTESTINAL: Soft, Nontender, Nondistended;   MUSCULOSKELETAL:  2+ Peripheral Pulses, No clubbing, cyanosis, or edema  NEUROLOGY: non-focal  SKIN: No rashes or lesions        ADMISSION SUMMARY  Patient is a 40y old Male who presents with a chief complaint of COVID (09 May 2021 07:26)  Currently admitted to medicine with the primary diagnosis of COVID-19    Hospital course has been complicated by _______.       ASSESSMENT & PLAN  39 y/o no significant past medical history presenting for shortness of breath since 5/3 (tested positive for COVID outpatient 5/1? after he had headache), admitted for severe COVID PNA requiring HFNC to maintain O2 saturation.     #Severe COVID PNA with suspected superimposed bacterial infection   -f/u urine strep/legionella (pending), blood culture negative 5/6  ->139>69, ferritin 1353>751, procalcitonin 2.08>0.37, d-dimer 531>2700>2549>3087.   -Lovenox research trial   -ID followup -  ceftriaxone 2g q24hr and levaquin 500mg q24hr, s/p Toci x1  -CXR with extensive bilateral opacities  - c/w remdesivir (day 1 was 5/5), dexamethasone 6mg q24 (day 1 was 5/5)  - Wean O2 as tolerated , HFNC 60/80 bipap overnight.   -f/u pulm - increased dexamethasone to 8mg q8 hrs 5/7.   -LE duplex 5/5 negative for dvt , repeat duplex results pending  - urine legionella negative  - blood culture negative 5/6    #Diet - regular   #GI ppx - pantoprazole  #DVT ppx - lovenox research trial  #Code status - full     Pending: f/u urine strep, inflammatory markers, wean o2.

## 2021-05-10 LAB
ALBUMIN SERPL ELPH-MCNC: 3.7 G/DL — SIGNIFICANT CHANGE UP (ref 3.5–5.2)
ALP SERPL-CCNC: 123 U/L — HIGH (ref 30–115)
ALT FLD-CCNC: 63 U/L — HIGH (ref 0–41)
ANION GAP SERPL CALC-SCNC: 17 MMOL/L — HIGH (ref 7–14)
AST SERPL-CCNC: 43 U/L — HIGH (ref 0–41)
AT III ACT/NOR PPP CHRO: 125 % — SIGNIFICANT CHANGE UP (ref 85–135)
BASOPHILS # BLD AUTO: 0.02 K/UL — SIGNIFICANT CHANGE UP (ref 0–0.2)
BASOPHILS NFR BLD AUTO: 0.2 % — SIGNIFICANT CHANGE UP (ref 0–1)
BILIRUB SERPL-MCNC: 0.4 MG/DL — SIGNIFICANT CHANGE UP (ref 0.2–1.2)
BUN SERPL-MCNC: 16 MG/DL — SIGNIFICANT CHANGE UP (ref 10–20)
CALCIUM SERPL-MCNC: 8.9 MG/DL — SIGNIFICANT CHANGE UP (ref 8.5–10.1)
CHLORIDE SERPL-SCNC: 98 MMOL/L — SIGNIFICANT CHANGE UP (ref 98–110)
CO2 SERPL-SCNC: 22 MMOL/L — SIGNIFICANT CHANGE UP (ref 17–32)
CREAT SERPL-MCNC: 0.6 MG/DL — LOW (ref 0.7–1.5)
EOSINOPHIL # BLD AUTO: 0 K/UL — SIGNIFICANT CHANGE UP (ref 0–0.7)
EOSINOPHIL NFR BLD AUTO: 0 % — SIGNIFICANT CHANGE UP (ref 0–8)
GLUCOSE SERPL-MCNC: 120 MG/DL — HIGH (ref 70–99)
HCT VFR BLD CALC: 37.3 % — LOW (ref 42–52)
HGB BLD-MCNC: 12.5 G/DL — LOW (ref 14–18)
IMM GRANULOCYTES NFR BLD AUTO: 5.4 % — HIGH (ref 0.1–0.3)
LYMPHOCYTES # BLD AUTO: 0.71 K/UL — LOW (ref 1.2–3.4)
LYMPHOCYTES # BLD AUTO: 7.4 % — LOW (ref 20.5–51.1)
MAGNESIUM SERPL-MCNC: 2.1 MG/DL — SIGNIFICANT CHANGE UP (ref 1.8–2.4)
MCHC RBC-ENTMCNC: 26.7 PG — LOW (ref 27–31)
MCHC RBC-ENTMCNC: 33.5 G/DL — SIGNIFICANT CHANGE UP (ref 32–37)
MCV RBC AUTO: 79.5 FL — LOW (ref 80–94)
MONOCYTES # BLD AUTO: 0.62 K/UL — HIGH (ref 0.1–0.6)
MONOCYTES NFR BLD AUTO: 6.5 % — SIGNIFICANT CHANGE UP (ref 1.7–9.3)
NEUTROPHILS # BLD AUTO: 7.71 K/UL — HIGH (ref 1.4–6.5)
NEUTROPHILS NFR BLD AUTO: 80.5 % — HIGH (ref 42.2–75.2)
NRBC # BLD: 0 /100 WBCS — SIGNIFICANT CHANGE UP (ref 0–0)
PLATELET # BLD AUTO: 258 K/UL — SIGNIFICANT CHANGE UP (ref 130–400)
POTASSIUM SERPL-MCNC: 4.9 MMOL/L — SIGNIFICANT CHANGE UP (ref 3.5–5)
POTASSIUM SERPL-SCNC: 4.9 MMOL/L — SIGNIFICANT CHANGE UP (ref 3.5–5)
PROT S FREE AG PPP IA-ACNC: 35 % — LOW (ref 67–141)
PROT S FREE PPP-ACNC: 57 % — LOW (ref 63–140)
PROT SERPL-MCNC: 6.6 G/DL — SIGNIFICANT CHANGE UP (ref 6–8)
RBC # BLD: 4.69 M/UL — LOW (ref 4.7–6.1)
RBC # FLD: 12.3 % — SIGNIFICANT CHANGE UP (ref 11.5–14.5)
S PNEUM AG UR QL: NEGATIVE — SIGNIFICANT CHANGE UP
SODIUM SERPL-SCNC: 137 MMOL/L — SIGNIFICANT CHANGE UP (ref 135–146)
WBC # BLD: 9.58 K/UL — SIGNIFICANT CHANGE UP (ref 4.8–10.8)
WBC # FLD AUTO: 9.58 K/UL — SIGNIFICANT CHANGE UP (ref 4.8–10.8)

## 2021-05-10 PROCEDURE — 99233 SBSQ HOSP IP/OBS HIGH 50: CPT

## 2021-05-10 PROCEDURE — 71045 X-RAY EXAM CHEST 1 VIEW: CPT | Mod: 26

## 2021-05-10 PROCEDURE — 99232 SBSQ HOSP IP/OBS MODERATE 35: CPT

## 2021-05-10 RX ADMIN — Medication 101.6 MILLIGRAM(S): at 13:18

## 2021-05-10 RX ADMIN — CEFTRIAXONE 100 MILLIGRAM(S): 500 INJECTION, POWDER, FOR SOLUTION INTRAMUSCULAR; INTRAVENOUS at 10:26

## 2021-05-10 RX ADMIN — REMDESIVIR 500 MILLIGRAM(S): 5 INJECTION INTRAVENOUS at 05:04

## 2021-05-10 RX ADMIN — PANTOPRAZOLE SODIUM 40 MILLIGRAM(S): 20 TABLET, DELAYED RELEASE ORAL at 05:05

## 2021-05-10 RX ADMIN — Medication 101.6 MILLIGRAM(S): at 21:18

## 2021-05-10 RX ADMIN — Medication 101.6 MILLIGRAM(S): at 05:05

## 2021-05-10 NOTE — RESEARCH COMMUNICATION NOTE - NS AS RESEARCH COMMUNICATION NOTE FT
This is a reminder that patient is currently enrolled in the Hep-COVID clinical trial (prophylactic Vs therapeutic Lovenox) and is receiving a blinded dose.     No events related to the study drug reported.     LE DUPLEX REPEAT (ORDERED BY PRIMARY TEAM) IS NEGATIVE FOR DVT    For any issue please reach out to the research department (Sana [phone] or Teams) and Dr Bess ([phone] or Teams).

## 2021-05-10 NOTE — PROGRESS NOTE ADULT - ASSESSMENT
IMPRESSION:    Acute hypoxemic respiratory failure slowly improving   Severe COVID pneumonia SP TOCI   Probable superimposed bacterial pneumonia     PLAN:    CNS: No depressants     HEENT: Oral care    PULMONARY:  HOB @ 45 degrees.  Aspiration precautions.  Continue HFNCO2.  Wean O2 as tolerated.  David MURRAY#6.  Encourage incentive spirometry and NIV during sleep      CARDIOVASCULAR:  Avoid volume overload     GI: GI prophylaxis.  Feeding.  Bowel regimen     RENAL:  Follow up lytes.  Correct as needed    INFECTIOUS DISEASE: Follow up cultures.  Continue ABX. FU Urine Legionella  and Strep Ag.  Nasal MRSA.      HEMATOLOGICAL:  DVT prophylaxis.     ENDOCRINE:  Follow up FS.  Insulin protocol if needed.    MUSCULOSKELETAL:  OOB to chair      CEU     Prognosis guarded

## 2021-05-10 NOTE — PROGRESS NOTE ADULT - ASSESSMENT
41 y/o obese Male without significant PMX presented to the ED with worsening SOB and Hypoxia, admitted to SDU with severe COVID PNA  Onset of symptoms 5/1    Acute hypoxemic respiratory failure secondary to COVID 19 PNA  Suspected superimposed bacterial PNA  Obesity  Transaminitis  Currently on HFNC 60/70 saturating 97-99%  on Dexamethasone 8mg Q8H, started 5/5  s/p Toci 5/7/2021 and RDV   Ddimer 531--->2700--->2549--->3000   LE duplex 5/5 without DVTs. Patient enrolled in lovenox study  Repeat LE duplex 5/9 without DVTs  Procal 2.08--->0.37, Urine legionella neg, pending nasal mrsa and urine strep  c/w Rocephin 2g Q24H and Levaquin 500 Q24H  encourage incentive spirometry   taper down supplemental oxygen as tolerated  trend inflammatory markers  pulm cc following    Patient requires continuous monitoring in CEU .

## 2021-05-10 NOTE — PROGRESS NOTE ADULT - SUBJECTIVE AND OBJECTIVE BOX
Patient is a 40y old  Male who presents with a chief complaint of COVID (09 May 2021 11:13)        Over Night Events:  Feels better.  NO SOB at rest.  REID         ROS:     All ROS are negative except HPI         PHYSICAL EXAM    ICU Vital Signs Last 24 Hrs  T(C): 36.7 (10 May 2021 06:08), Max: 37.1 (09 May 2021 08:09)  T(F): 98.1 (10 May 2021 06:08), Max: 98.8 (09 May 2021 14:11)  HR: 77 (10 May 2021 06:08) (73 - 80)  BP: 122/78 (10 May 2021 06:08) (121/68 - 138/73)  BP(mean): --  ABP: --  ABP(mean): --  RR: 18 (10 May 2021 06:08) (18 - 18)  SpO2: 97% (10 May 2021 06:08) (96% - 100%)      CONSTITUTIONAL:  Well nourished.  NAD    ENT:   Airway patent,   Mouth with normal mucosa.   No thrush    EYES:   Pupils equal,   Round and reactive to light.    CARDIAC:   Normal rate,   Regular rhythm.    No edema      Vascular:  Normal systolic impulse  No Carotid bruits    RESPIRATORY:   No wheezing  Bilateral BS  Normal chest expansion  Not tachypneic,  No use of accessory muscles    GASTROINTESTINAL:  Abdomen soft,   Non-tender,   No guarding,   + BS    MUSCULOSKELETAL:   Range of motion is not limited,  No clubbing, cyanosis    NEUROLOGICAL:   Alert and oriented   No motor  deficits.    SKIN:   Skin normal color for race,   Warm and dry and intact.   No evidence of rash.    PSYCHIATRIC:   Normal mood and affect.   No apparent risk to self or others.    HEMATOLOGICAL:  No cervical  lymphadenopathy.  no inguinal lymphadenopathy      05-09-21 @ 07:01  -  05-10-21 @ 07:00  --------------------------------------------------------  IN:    IV PiggyBack: 50 mL    IV PiggyBack: 250 mL  Total IN: 300 mL    OUT:    Voided (mL): 1000 mL  Total OUT: 1000 mL    Total NET: -700 mL          LABS:                            12.0   7.46  )-----------( 264      ( 09 May 2021 08:41 )             35.3                                               05-09    135  |  97<L>  |  14  ----------------------------<  140<H>  4.1   |  24  |  0.6<L>    Ca    8.4<L>      09 May 2021 08:41  Mg     2.1     05-09    TPro  6.2  /  Alb  3.4<L>  /  TBili  0.4  /  DBili  x   /  AST  44<H>  /  ALT  48<H>  /  AlkPhos  126<H>  05-09                                                                                           LIVER FUNCTIONS - ( 09 May 2021 08:41 )  Alb: 3.4 g/dL / Pro: 6.2 g/dL / ALK PHOS: 126 U/L / ALT: 48 U/L / AST: 44 U/L / GGT: x                                                                                                                                       MEDICATIONS  (STANDING):  cefTRIAXone   IVPB 2000 milliGRAM(s) IV Intermittent every 24 hours  chlorhexidine 4% Liquid 1 Application(s) Topical <User Schedule>  dexAMETHasone  IVPB 8 milliGRAM(s) IV Intermittent every 8 hours  enoxaparin Study Injectable () 1 Dose(s) SubCutaneous two times a day  levoFLOXacin IVPB      levoFLOXacin IVPB 500 milliGRAM(s) IV Intermittent every 24 hours  pantoprazole    Tablet 40 milliGRAM(s) Oral before breakfast    MEDICATIONS  (PRN):  aluminum hydroxide/magnesium hydroxide/simethicone Suspension 30 milliLiter(s) Oral every 4 hours PRN Dyspepsia      New X-rays reviewed:                                                                                  ECHO    CXR interpreted by me:  Improving bilateral infiltrates

## 2021-05-10 NOTE — PROGRESS NOTE ADULT - SUBJECTIVE AND OBJECTIVE BOX
MICHAEL ARENAS 40y Male  MRN#: [id number]   CODE STATUS:___full      SUBJECTIVE  Patient is a 40y old Male who presents with a chief complaint of COVID (10 May 2021 09:20)  Currently admitted to medicine with the primary diagnosis of COVID-19      Today is hospital day 5d, and this morning he is feeling well and reports no issues overnight. COntinues to be on HFNC 60/70./         OBJECTIVE  PAST MEDICAL & SURGICAL HISTORY  No pertinent past medical history    No significant past surgical history      ALLERGIES:  No Known Allergies    MEDICATIONS:  STANDING MEDICATIONS  cefTRIAXone   IVPB 2000 milliGRAM(s) IV Intermittent every 24 hours  chlorhexidine 4% Liquid 1 Application(s) Topical <User Schedule>  dexAMETHasone  IVPB 8 milliGRAM(s) IV Intermittent every 8 hours  enoxaparin Study Injectable () 1 Dose(s) SubCutaneous two times a day  levoFLOXacin IVPB      levoFLOXacin IVPB 500 milliGRAM(s) IV Intermittent every 24 hours  pantoprazole    Tablet 40 milliGRAM(s) Oral before breakfast    PRN MEDICATIONS  aluminum hydroxide/magnesium hydroxide/simethicone Suspension 30 milliLiter(s) Oral every 4 hours PRN      VITAL SIGNS: Last 24 Hours  T(C): 37.3 (10 May 2021 08:47), Max: 37.3 (10 May 2021 08:47)  T(F): 99.2 (10 May 2021 08:47), Max: 99.2 (10 May 2021 08:47)  HR: 78 (10 May 2021 08:47) (73 - 80)  BP: 134/75 (10 May 2021 08:47) (122/70 - 138/73)  BP(mean): --  RR: 18 (10 May 2021 08:47) (18 - 18)  SpO2: 99% (10 May 2021 08:47) (96% - 100%)    LABS:                        12.5   9.58  )-----------( 258      ( 10 May 2021 08:49 )             37.3     05-09    135  |  97<L>  |  14  ----------------------------<  140<H>  4.1   |  24  |  0.6<L>    Ca    8.4<L>      09 May 2021 08:41  Mg     2.1     05-09    TPro  6.2  /  Alb  3.4<L>  /  TBili  0.4  /  DBili  x   /  AST  44<H>  /  ALT  48<H>  /  AlkPhos  126<H>  05-09                  RADIOLOGY:  < from: [facility] Duplex Lower Ext Vein Scan, Bilat (05.08.21 @ 16:19) >    Impression:    No evidence of deep venous thrombosis or superficial thrombophlebitis in the bilateral lower extremities.    < end of copied text >    < from: Xray Chest 1 View- PORTABLE-Routine (Xray Chest 1 View- PORTABLE-Routine in AM.) (05.10.21 @ 06:41) >    Impression:    Unchanged bilateral opacities.      < end of copied text >      PHYSICAL EXAM:  GENERAL: NAD, obese, AAOx3  HEENT:  Atraumatic, Normocephalic. EOMI, PERRLA,   PULMONARY: bilateral crackles L>R at base.   CARDIOVASCULAR: Regular rate and rhythm; No murmurs  GASTROINTESTINAL: Soft, Nontender, Nondistended;   MUSCULOSKELETAL:  2+ Peripheral Pulses, No clubbing, cyanosis, or edema  NEUROLOGY: non-focal  SKIN: No rashes or lesions        ADMISSION SUMMARY  Patient is a 40y old Male who presents with a chief complaint of COVID (10 May 2021 09:20)  Currently admitted to medicine with the primary diagnosis of COVID-19    Hospital course has been complicated by _______.       ASSESSMENT & PLAN  41 y/o no significant past medical history presenting for shortness of breath since 5/3 (tested positive for COVID outpatient 5/1? after he had headache), admitted for severe COVID PNA requiring HFNC to maintain O2 saturation.     #Severe COVID PNA with suspected superimposed bacterial infection   -f/u urine strep/legionella (pending), blood culture negative 5/6  ->139>69, ferritin 1353>751, procalcitonin 2.08>0.37, d-dimer 531>2700>2549>3087.   -Lovenox research trial   -ID followup -  ceftriaxone 2g q24hr and levaquin 500mg q24hr, s/p Toci x1, plan to continue abx until on RA per ID.  -CXR with extensive bilateral opacities  - c/w remdesivir (day 1 was 5/5), dexamethasone 6mg q24 (day 1 was 5/5)  - Wean O2 as tolerated , HFNC 60/70 saturating mid to high 90s  -f/u pulm - increased dexamethasone to 8mg q8 hrs 5/7.   -LE duplex 5/5 negative for dvt , repeat duplex prelim negative   - urine legionella negative, urine strep pending  - blood culture negative 5/6    #Diet - regular   #GI ppx - pantoprazole  #DVT ppx - lovenox research trial  #Code status - full     Pending: f/u urine strep, inflammatory markers, wean o2.          MICHAEL ARENAS 40y Male  MRN#: [id number]   CODE STATUS:___full      SUBJECTIVE  Patient is a 40y old Male who presents with a chief complaint of COVID (10 May 2021 09:20)  Currently admitted to medicine with the primary diagnosis of COVID-19      Today is hospital day 5d, and this morning he is feeling well and reports no issues overnight. COntinues to be on HFNC 60/70./         OBJECTIVE  PAST MEDICAL & SURGICAL HISTORY  No pertinent past medical history    No significant past surgical history      ALLERGIES:  No Known Allergies    MEDICATIONS:  STANDING MEDICATIONS  cefTRIAXone   IVPB 2000 milliGRAM(s) IV Intermittent every 24 hours  chlorhexidine 4% Liquid 1 Application(s) Topical <User Schedule>  dexAMETHasone  IVPB 8 milliGRAM(s) IV Intermittent every 8 hours  enoxaparin Study Injectable () 1 Dose(s) SubCutaneous two times a day  levoFLOXacin IVPB      levoFLOXacin IVPB 500 milliGRAM(s) IV Intermittent every 24 hours  pantoprazole    Tablet 40 milliGRAM(s) Oral before breakfast    PRN MEDICATIONS  aluminum hydroxide/magnesium hydroxide/simethicone Suspension 30 milliLiter(s) Oral every 4 hours PRN      VITAL SIGNS: Last 24 Hours  T(C): 37.3 (10 May 2021 08:47), Max: 37.3 (10 May 2021 08:47)  T(F): 99.2 (10 May 2021 08:47), Max: 99.2 (10 May 2021 08:47)  HR: 78 (10 May 2021 08:47) (73 - 80)  BP: 134/75 (10 May 2021 08:47) (122/70 - 138/73)  BP(mean): --  RR: 18 (10 May 2021 08:47) (18 - 18)  SpO2: 99% (10 May 2021 08:47) (96% - 100%)    LABS:                        12.5   9.58  )-----------( 258      ( 10 May 2021 08:49 )             37.3     05-09    135  |  97<L>  |  14  ----------------------------<  140<H>  4.1   |  24  |  0.6<L>    Ca    8.4<L>      09 May 2021 08:41  Mg     2.1     05-09    TPro  6.2  /  Alb  3.4<L>  /  TBili  0.4  /  DBili  x   /  AST  44<H>  /  ALT  48<H>  /  AlkPhos  126<H>  05-09                  RADIOLOGY:  < from: [facility] Duplex Lower Ext Vein Scan, Bilat (05.08.21 @ 16:19) >    Impression:    No evidence of deep venous thrombosis or superficial thrombophlebitis in the bilateral lower extremities.    < end of copied text >    < from: Xray Chest 1 View- PORTABLE-Routine (Xray Chest 1 View- PORTABLE-Routine in AM.) (05.10.21 @ 06:41) >    Impression:    Unchanged bilateral opacities.      < end of copied text >      PHYSICAL EXAM:  GENERAL: NAD, obese, AAOx3  HEENT:  Atraumatic, Normocephalic. EOMI, PERRLA,   PULMONARY: bilateral crackles/rhonchi  CARDIOVASCULAR: Regular rate and rhythm; No murmurs  GASTROINTESTINAL: Soft, Nontender, Nondistended;   MUSCULOSKELETAL:  2+ Peripheral Pulses, No clubbing, cyanosis, or edema  NEUROLOGY: non-focal  SKIN: No rashes or lesions        ADMISSION SUMMARY  Patient is a 40y old Male who presents with a chief complaint of COVID (10 May 2021 09:20)  Currently admitted to medicine with the primary diagnosis of COVID-19    Hospital course has been complicated by _______.       ASSESSMENT & PLAN  41 y/o no significant past medical history presenting for shortness of breath since 5/3 (tested positive for COVID outpatient 5/1? after he had headache), admitted for severe COVID PNA requiring HFNC to maintain O2 saturation.     #Severe COVID PNA with suspected superimposed bacterial infection   -f/u urine strep/legionella (pending), blood culture negative 5/6  ->139>69, ferritin 1353>751, procalcitonin 2.08>0.37, d-dimer 531>2700>2549>3087.   -Lovenox research trial   -ID followup -  ceftriaxone 2g q24hr and levaquin 500mg q24hr, s/p Toci x1, plan to continue abx until on RA per ID.  -CXR with extensive bilateral opacities  - c/w remdesivir (day 1 was 5/5), dexamethasone 6mg q24 (day 1 was 5/5)  - Wean O2 as tolerated , HFNC 60/70 saturating mid to high 90s  -f/u pulm - increased dexamethasone to 8mg q8 hrs 5/7.   -LE duplex 5/5 negative for dvt , repeat duplex prelim negative   - urine legionella negative, urine strep pending  - blood culture negative 5/6    #Diet - regular   #GI ppx - pantoprazole  #DVT ppx - lovenox research trial  #Code status - full     Pending: f/u urine strep, inflammatory markers, wean o2.

## 2021-05-10 NOTE — PROGRESS NOTE ADULT - ASSESSMENT
· Assessment	  41 y/o M with no PMH presents with SOB. Patient woke up May 1 with bad headache. His wife had tested positive for COVID, so he went to get tested and was positive. Today he noticed his SOB was worsening so he went get plasma, while there they noted he was saturating in low 80s so they did not give him plasma and sent him to the hospital.     IMPRESSION;  COVID 19 with severe illness. SpO2 < 94% on RA and need for supplemental O2.   Clinically significantly improved  CXR with reduced opacities  Inflammatory markers reduced  Pt was in the early viral replicative phase based on the timeline/onset of symptoms.  Resolving cytokine response/cytokine storm.  Excellent response to Toci    procalcitonin 2.08>0.37  Ferritin 1353>751  >69  Ddimers 531>2700>3087    5/6 BCx NGTD  5/6 Legionella NG  5/7 S/p Toci  s/p RDV    RECOMMENDATIONS;  Target SpO2 92 % to 96 %  Rocephin 2 gm iv q24h  Levoquin 500 mg iv q24h  Dexamethasone 6 mg iv q24h for 10 days.  Monitor for side effects: hyperglycemia, neurological ( agitation/confusion), adrenal suppression, bacterial and fungal infections  will deescalate ABx once on RA

## 2021-05-10 NOTE — PROGRESS NOTE ADULT - SUBJECTIVE AND OBJECTIVE BOX
MICHAEL ARENAS  40y Male    CHIEF COMPLAINT:    Patient is a 40y old  Male who presents with a chief complaint of COVID (10 May 2021 10:19)      INTERVAL HPI/OVERNIGHT EVENTS:    Patient seen and examined. No acute events overnight. Patient feels well, not sob    ROS: All other systems are negative.    Vital Signs:    T(F): 99.2 (05-10-21 @ 08:47), Max: 99.2 (05-10-21 @ 08:47)  HR: 78 (05-10-21 @ 08:47) (73 - 80)  BP: 134/75 (05-10-21 @ 08:47) (122/70 - 138/73)  RR: 18 (05-10-21 @ 08:47) (18 - 18)  SpO2: 99% (05-10-21 @ 08:47) (96% - 100%)    09 May 2021 07:01  -  10 May 2021 07:00  --------------------------------------------------------  IN: 300 mL / OUT: 1000 mL / NET: -700 mL    PHYSICAL EXAM:    GENERAL:  NAD  SKIN: No rashes or lesions  HEENT: Atraumatic. Normocephalic.  NECK: Supple, No JVD.  PULMONARY: coarse breath sounds. No wheezing. No rales  CVS: Normal S1, S2. Rate and Rhythm are regular.   ABDOMEN/GI: Soft, Nontender, Nondistended   MSK:  No clubbing or cyanosis  NEUROLOGIC:  No motor or sensory deficit.  PSYCH: Alert & oriented x 3, normal affect    Consultant(s) Notes Reviewed:  [x ] YES  [ ] NO  Care Discussed with Consultants/Other Providers [ x] YES  [ ] NO    LABS:                        12.5   9.58  )-----------( 258      ( 10 May 2021 08:49 )             37.3     137  |  98  |  16  ----------------------------<  120<H>  4.9   |  22  |  0.6<L>    Ca    8.9      10 May 2021 08:49  Mg     2.1     05-10    TPro  6.6  /  Alb  3.7  /  TBili  0.4  /  DBili  x   /  AST  43<H>  /  ALT  63<H>  /  AlkPhos  123<H>  05-10    RADIOLOGY & ADDITIONAL TESTS:  Imaging or report Personally Reviewed:  [x] YES  [ ] NO  EKG reviewed: [x] YES  [ ] NO    Medications:  Standing  cefTRIAXone   IVPB 2000 milliGRAM(s) IV Intermittent every 24 hours  chlorhexidine 4% Liquid 1 Application(s) Topical <User Schedule>  dexAMETHasone  IVPB 8 milliGRAM(s) IV Intermittent every 8 hours  enoxaparin Study Injectable () 1 Dose(s) SubCutaneous two times a day  levoFLOXacin IVPB      levoFLOXacin IVPB 500 milliGRAM(s) IV Intermittent every 24 hours  pantoprazole    Tablet 40 milliGRAM(s) Oral before breakfast    PRN Meds  aluminum hydroxide/magnesium hydroxide/simethicone Suspension 30 milliLiter(s) Oral every 4 hours PRN

## 2021-05-10 NOTE — PROGRESS NOTE ADULT - SUBJECTIVE AND OBJECTIVE BOX
QUINCYMICHAEL WONG  40y, Male    All available historical data reviewed    OVERNIGHT EVENTS:  no fevers  feels well and has no complaints  HFNC  No cough, SOB improved    ROS:  General: Denies rigors, nightsweats  HEENT: Denies headache, rhinorrhea, sore throat, eye pain  CV: Denies CP, palpitations  PULM: Denies wheezing, hemoptysis  GI: Denies hematemesis, hematochezia, melena  : Denies discharge, hematuria  MSK: Denies arthralgias, myalgias  SKIN: Denies rash, lesions  NEURO: Denies paresthesias, weakness  PSYCH: Denies depression, anxiety    VITALS:  T(F): 99.2, Max: 99.2 (05-10-21 @ 08:47)  HR: 78  BP: 134/75  RR: 18Vital Signs Last 24 Hrs  T(C): 37.3 (10 May 2021 08:47), Max: 37.3 (10 May 2021 08:47)  T(F): 99.2 (10 May 2021 08:47), Max: 99.2 (10 May 2021 08:47)  HR: 78 (10 May 2021 08:47) (73 - 80)  BP: 134/75 (10 May 2021 08:47) (122/70 - 138/73)  BP(mean): --  RR: 18 (10 May 2021 08:47) (18 - 18)  SpO2: 99% (10 May 2021 08:47) (96% - 100%)    TESTS & MEASUREMENTS:                        12.0   7.46  )-----------( 264      ( 09 May 2021 08:41 )             35.3     05-09    135  |  97<L>  |  14  ----------------------------<  140<H>  4.1   |  24  |  0.6<L>    Ca    8.4<L>      09 May 2021 08:41  Mg     2.1     05-09    TPro  6.2  /  Alb  3.4<L>  /  TBili  0.4  /  DBili  x   /  AST  44<H>  /  ALT  48<H>  /  AlkPhos  126<H>  05-09    LIVER FUNCTIONS - ( 09 May 2021 08:41 )  Alb: 3.4 g/dL / Pro: 6.2 g/dL / ALK PHOS: 126 U/L / ALT: 48 U/L / AST: 44 U/L / GGT: x             Culture - Blood (collected 05-06-21 @ 08:28)  Source: .Blood None  Preliminary Report (05-07-21 @ 18:01):    No growth to date.            RADIOLOGY & ADDITIONAL TESTS:  Personal review of radiological diagnostics performed  Echo and EKG results noted when applicable.     MEDICATIONS:  aluminum hydroxide/magnesium hydroxide/simethicone Suspension 30 milliLiter(s) Oral every 4 hours PRN  cefTRIAXone   IVPB 2000 milliGRAM(s) IV Intermittent every 24 hours  chlorhexidine 4% Liquid 1 Application(s) Topical <User Schedule>  dexAMETHasone  IVPB 8 milliGRAM(s) IV Intermittent every 8 hours  enoxaparin Study Injectable () 1 Dose(s) SubCutaneous two times a day  levoFLOXacin IVPB      levoFLOXacin IVPB 500 milliGRAM(s) IV Intermittent every 24 hours  pantoprazole    Tablet 40 milliGRAM(s) Oral before breakfast      ANTIBIOTICS:  cefTRIAXone   IVPB 2000 milliGRAM(s) IV Intermittent every 24 hours  levoFLOXacin IVPB      levoFLOXacin IVPB 500 milliGRAM(s) IV Intermittent every 24 hours

## 2021-05-11 LAB
ALBUMIN SERPL ELPH-MCNC: 3.8 G/DL — SIGNIFICANT CHANGE UP (ref 3.5–5.2)
ALP SERPL-CCNC: 105 U/L — SIGNIFICANT CHANGE UP (ref 30–115)
ALT FLD-CCNC: 61 U/L — HIGH (ref 0–41)
ANION GAP SERPL CALC-SCNC: 15 MMOL/L — HIGH (ref 7–14)
AST SERPL-CCNC: 33 U/L — SIGNIFICANT CHANGE UP (ref 0–41)
BASOPHILS # BLD AUTO: 0.02 K/UL — SIGNIFICANT CHANGE UP (ref 0–0.2)
BASOPHILS NFR BLD AUTO: 0.2 % — SIGNIFICANT CHANGE UP (ref 0–1)
BILIRUB SERPL-MCNC: 0.4 MG/DL — SIGNIFICANT CHANGE UP (ref 0.2–1.2)
BUN SERPL-MCNC: 17 MG/DL — SIGNIFICANT CHANGE UP (ref 10–20)
CALCIUM SERPL-MCNC: 9 MG/DL — SIGNIFICANT CHANGE UP (ref 8.5–10.1)
CHLORIDE SERPL-SCNC: 96 MMOL/L — LOW (ref 98–110)
CO2 SERPL-SCNC: 22 MMOL/L — SIGNIFICANT CHANGE UP (ref 17–32)
CREAT SERPL-MCNC: 0.5 MG/DL — LOW (ref 0.7–1.5)
CRP SERPL-MCNC: 12 MG/L — HIGH
CULTURE RESULTS: SIGNIFICANT CHANGE UP
D DIMER BLD IA.RAPID-MCNC: 5286 NG/ML DDU — HIGH (ref 0–230)
EOSINOPHIL # BLD AUTO: 0 K/UL — SIGNIFICANT CHANGE UP (ref 0–0.7)
EOSINOPHIL NFR BLD AUTO: 0 % — SIGNIFICANT CHANGE UP (ref 0–8)
GLUCOSE SERPL-MCNC: 135 MG/DL — HIGH (ref 70–99)
HCT VFR BLD CALC: 37.9 % — LOW (ref 42–52)
HGB BLD-MCNC: 13 G/DL — LOW (ref 14–18)
IMM GRANULOCYTES NFR BLD AUTO: 4.5 % — HIGH (ref 0.1–0.3)
LYMPHOCYTES # BLD AUTO: 0.6 K/UL — LOW (ref 1.2–3.4)
LYMPHOCYTES # BLD AUTO: 5.2 % — LOW (ref 20.5–51.1)
MAGNESIUM SERPL-MCNC: 2.1 MG/DL — SIGNIFICANT CHANGE UP (ref 1.8–2.4)
MCHC RBC-ENTMCNC: 26.9 PG — LOW (ref 27–31)
MCHC RBC-ENTMCNC: 34.3 G/DL — SIGNIFICANT CHANGE UP (ref 32–37)
MCV RBC AUTO: 78.3 FL — LOW (ref 80–94)
MONOCYTES # BLD AUTO: 0.53 K/UL — SIGNIFICANT CHANGE UP (ref 0.1–0.6)
MONOCYTES NFR BLD AUTO: 4.6 % — SIGNIFICANT CHANGE UP (ref 1.7–9.3)
NEUTROPHILS # BLD AUTO: 9.85 K/UL — HIGH (ref 1.4–6.5)
NEUTROPHILS NFR BLD AUTO: 85.5 % — HIGH (ref 42.2–75.2)
NRBC # BLD: 0 /100 WBCS — SIGNIFICANT CHANGE UP (ref 0–0)
PLATELET # BLD AUTO: 264 K/UL — SIGNIFICANT CHANGE UP (ref 130–400)
POTASSIUM SERPL-MCNC: 4 MMOL/L — SIGNIFICANT CHANGE UP (ref 3.5–5)
POTASSIUM SERPL-SCNC: 4 MMOL/L — SIGNIFICANT CHANGE UP (ref 3.5–5)
PROT SERPL-MCNC: 6.5 G/DL — SIGNIFICANT CHANGE UP (ref 6–8)
RBC # BLD: 4.84 M/UL — SIGNIFICANT CHANGE UP (ref 4.7–6.1)
RBC # FLD: 12.4 % — SIGNIFICANT CHANGE UP (ref 11.5–14.5)
SODIUM SERPL-SCNC: 133 MMOL/L — LOW (ref 135–146)
SPECIMEN SOURCE: SIGNIFICANT CHANGE UP
WBC # BLD: 11.52 K/UL — HIGH (ref 4.8–10.8)
WBC # FLD AUTO: 11.52 K/UL — HIGH (ref 4.8–10.8)

## 2021-05-11 PROCEDURE — 99233 SBSQ HOSP IP/OBS HIGH 50: CPT

## 2021-05-11 PROCEDURE — 71275 CT ANGIOGRAPHY CHEST: CPT | Mod: 26

## 2021-05-11 RX ORDER — ENOXAPARIN SODIUM 100 MG/ML
130 INJECTION SUBCUTANEOUS EVERY 12 HOURS
Refills: 0 | Status: DISCONTINUED | OUTPATIENT
Start: 2021-05-12 | End: 2021-05-13

## 2021-05-11 RX ORDER — ENOXAPARIN SODIUM 100 MG/ML
130 INJECTION SUBCUTANEOUS
Refills: 0 | Status: DISCONTINUED | OUTPATIENT
Start: 2021-05-11 | End: 2021-05-11

## 2021-05-11 RX ORDER — ENOXAPARIN SODIUM 100 MG/ML
90 INJECTION SUBCUTANEOUS ONCE
Refills: 0 | Status: COMPLETED | OUTPATIENT
Start: 2021-05-11 | End: 2021-05-11

## 2021-05-11 RX ADMIN — Medication 101.6 MILLIGRAM(S): at 13:43

## 2021-05-11 RX ADMIN — Medication 101.6 MILLIGRAM(S): at 21:18

## 2021-05-11 RX ADMIN — PANTOPRAZOLE SODIUM 40 MILLIGRAM(S): 20 TABLET, DELAYED RELEASE ORAL at 09:02

## 2021-05-11 RX ADMIN — Medication 101.6 MILLIGRAM(S): at 05:49

## 2021-05-11 RX ADMIN — ENOXAPARIN SODIUM 90 MILLIGRAM(S): 100 INJECTION SUBCUTANEOUS at 21:30

## 2021-05-11 RX ADMIN — CEFTRIAXONE 100 MILLIGRAM(S): 500 INJECTION, POWDER, FOR SOLUTION INTRAMUSCULAR; INTRAVENOUS at 11:42

## 2021-05-11 NOTE — PROGRESS NOTE ADULT - SUBJECTIVE AND OBJECTIVE BOX
MICHAEL ARENAS  40y Male    CHIEF COMPLAINT:    Patient is a 40y old  Male who presents with a chief complaint of COVID (11 May 2021 14:45)      INTERVAL HPI/OVERNIGHT EVENTS:    Patient seen and examined. No acute events overnight. transitioned to NC    ROS: All other systems are negative.    Vital Signs:    T(F): 98.6 (05-11-21 @ 13:00), Max: 98.6 (05-10-21 @ 16:15)  HR: 83 (05-11-21 @ 13:00) (71 - 83)  BP: 143/89 (05-11-21 @ 13:00) (118/73 - 155/90)  RR: 18 (05-11-21 @ 13:00) (18 - 20)  SpO2: 93% (05-11-21 @ 13:00) (93% - 98%)    10 May 2021 07:01  -  11 May 2021 07:00  --------------------------------------------------------  IN: 0 mL / OUT: 3 mL / NET: -3 mL    PHYSICAL EXAM:    GENERAL:  NAD  SKIN: No rashes or lesions  HEENT: Atraumatic. Normocephalic.   NECK: Supple, No JVD.  PULMONARY: CTA B/L. No wheezing. No rales  CVS: Normal S1, S2. Rate and Rhythm are regular.  ABDOMEN/GI: Soft, Nontender, Nondistended;   MSK:  no clubbing or cyanosis  NEUROLOGIC: moves all extremities  PSYCH: Alert & oriented x 3, normal affect    Consultant(s) Notes Reviewed:  [x ] YES  [ ] NO  Care Discussed with Consultants/Other Providers [ x] YES  [ ] NO    LABS:                        13.0   11.52 )-----------( 264      ( 11 May 2021 09:36 )             37.9     133<L>  |  96<L>  |  17  ----------------------------<  135<H>  4.0   |  22  |  0.5<L>    Ca    9.0      11 May 2021 09:36  Mg     2.1     05-11    TPro  6.5  /  Alb  3.8  /  TBili  0.4  /  DBili  x   /  AST  33  /  ALT  61<H>  /  AlkPhos  105  05-11    RADIOLOGY & ADDITIONAL TESTS:  Imaging or report Personally Reviewed:  [x] YES  [ ] NO  EKG reviewed: [x] YES  [ ] NO    Medications:  Standing  chlorhexidine 4% Liquid 1 Application(s) Topical <User Schedule>  dexAMETHasone  IVPB 8 milliGRAM(s) IV Intermittent every 8 hours  enoxaparin Study Injectable () 1 Dose(s) SubCutaneous two times a day  levoFLOXacin IVPB      levoFLOXacin IVPB 500 milliGRAM(s) IV Intermittent every 24 hours  pantoprazole    Tablet 40 milliGRAM(s) Oral before breakfast    PRN Meds  aluminum hydroxide/magnesium hydroxide/simethicone Suspension 30 milliLiter(s) Oral every 4 hours PRN

## 2021-05-11 NOTE — PROGRESS NOTE ADULT - SUBJECTIVE AND OBJECTIVE BOX
MICHAEL ARENAS 40y Male  MRN#: [id number]   CODE STATUS:____full____      SUBJECTIVE  Patient is a 40y old Male who presents with a chief complaint of COVID (10 May 2021 11:02)  Currently admitted to medicine with the primary diagnosis of COVID-19      Today is hospital day 6d, and this morning he is feeling well and reports no issues overnight. He feels less short of breath.           OBJECTIVE  PAST MEDICAL & SURGICAL HISTORY  No pertinent past medical history    No significant past surgical history      ALLERGIES:  No Known Allergies    MEDICATIONS:  STANDING MEDICATIONS  cefTRIAXone   IVPB 2000 milliGRAM(s) IV Intermittent every 24 hours  chlorhexidine 4% Liquid 1 Application(s) Topical <User Schedule>  dexAMETHasone  IVPB 8 milliGRAM(s) IV Intermittent every 8 hours  enoxaparin Study Injectable () 1 Dose(s) SubCutaneous two times a day  levoFLOXacin IVPB      levoFLOXacin IVPB 500 milliGRAM(s) IV Intermittent every 24 hours  pantoprazole    Tablet 40 milliGRAM(s) Oral before breakfast    PRN MEDICATIONS  aluminum hydroxide/magnesium hydroxide/simethicone Suspension 30 milliLiter(s) Oral every 4 hours PRN      VITAL SIGNS: Last 24 Hours  T(C): 36.9 (11 May 2021 08:00), Max: 37 (10 May 2021 16:15)  T(F): 98.4 (11 May 2021 08:00), Max: 98.6 (10 May 2021 16:15)  HR: 81 (11 May 2021 08:00) (71 - 81)  BP: 118/73 (11 May 2021 08:00) (118/73 - 155/90)  BP(mean): --  RR: 18 (11 May 2021 08:00) (18 - 20)  SpO2: 96% (11 May 2021 08:00) (96% - 98%)    LABS:                        12.5   9.58  )-----------( 258      ( 10 May 2021 08:49 )             37.3     05-10    137  |  98  |  16  ----------------------------<  120<H>  4.9   |  22  |  0.6<L>    Ca    8.9      10 May 2021 08:49  Mg     2.1     05-10    TPro  6.6  /  Alb  3.7  /  TBili  0.4  /  DBili  x   /  AST  43<H>  /  ALT  63<H>  /  AlkPhos  123<H>  05-10      RADIOLOGY:  < from: Xray Chest 1 View- PORTABLE-Routine (Xray Chest 1 View- PORTABLE-Routine in AM.) (05.10.21 @ 06:41) >    Impression:    Unchanged bilateral opacities.    < end of copied text >      PHYSICAL EXAM:    GENERAL: NAD, well-developed, AAOx3  HEENT:  Atraumatic, Normocephalic. EOMI, PERRLA,  PULMONARY: Good air flow upper lung fields, diminished at bases, coarse lung sounds at mid and lower  CARDIOVASCULAR: Regular rate and rhythm  GASTROINTESTINAL: Soft, Nontender, Nondistended  MUSCULOSKELETAL:  2+ Peripheral Pulses, No clubbing, cyanosis, or edema  NEUROLOGY: non-focal  SKIN: No rashes or lesions      ADMISSION SUMMARY  Patient is a 40y old Male who presents with a chief complaint of COVID (10 May 2021 11:02)  Currently admitted to medicine with the primary diagnosis of COVID-19      ASSESSMENT & PLAN   MICHAEL ARENAS 40y Male  MRN#: [id number]   CODE STATUS:____full____      SUBJECTIVE  Patient is a 40y old Male who presents with a chief complaint of COVID (10 May 2021 11:02)  Currently admitted to medicine with the primary diagnosis of COVID-19      Today is hospital day 6d, and this morning he is feeling well and reports no issues overnight. He feels less short of breath.           OBJECTIVE  PAST MEDICAL & SURGICAL HISTORY  No pertinent past medical history    No significant past surgical history      ALLERGIES:  No Known Allergies    MEDICATIONS:  STANDING MEDICATIONS  cefTRIAXone   IVPB 2000 milliGRAM(s) IV Intermittent every 24 hours  chlorhexidine 4% Liquid 1 Application(s) Topical <User Schedule>  dexAMETHasone  IVPB 8 milliGRAM(s) IV Intermittent every 8 hours  enoxaparin Study Injectable () 1 Dose(s) SubCutaneous two times a day  levoFLOXacin IVPB      levoFLOXacin IVPB 500 milliGRAM(s) IV Intermittent every 24 hours  pantoprazole    Tablet 40 milliGRAM(s) Oral before breakfast    PRN MEDICATIONS  aluminum hydroxide/magnesium hydroxide/simethicone Suspension 30 milliLiter(s) Oral every 4 hours PRN      VITAL SIGNS: Last 24 Hours  T(C): 36.9 (11 May 2021 08:00), Max: 37 (10 May 2021 16:15)  T(F): 98.4 (11 May 2021 08:00), Max: 98.6 (10 May 2021 16:15)  HR: 81 (11 May 2021 08:00) (71 - 81)  BP: 118/73 (11 May 2021 08:00) (118/73 - 155/90)  BP(mean): --  RR: 18 (11 May 2021 08:00) (18 - 20)  SpO2: 96% (11 May 2021 08:00) (96% - 98%)    LABS:                        12.5   9.58  )-----------( 258      ( 10 May 2021 08:49 )             37.3     05-10    137  |  98  |  16  ----------------------------<  120<H>  4.9   |  22  |  0.6<L>    Ca    8.9      10 May 2021 08:49  Mg     2.1     05-10    TPro  6.6  /  Alb  3.7  /  TBili  0.4  /  DBili  x   /  AST  43<H>  /  ALT  63<H>  /  AlkPhos  123<H>  05-10      RADIOLOGY:  < from: Xray Chest 1 View- PORTABLE-Routine (Xray Chest 1 View- PORTABLE-Routine in AM.) (05.10.21 @ 06:41) >    Impression:    Unchanged bilateral opacities.    < end of copied text >      PHYSICAL EXAM:    GENERAL: NAD, well-developed, AAOx3  HEENT:  Atraumatic, Normocephalic. EOMI, PERRLA,  PULMONARY: Good air flow upper lung fields, diminished at bases, coarse lung sounds at mid and lower  CARDIOVASCULAR: Regular rate and rhythm  GASTROINTESTINAL: Soft, Nontender, Nondistended  MUSCULOSKELETAL:  2+ Peripheral Pulses, No clubbing, cyanosis, or edema  NEUROLOGY: non-focal  SKIN: No rashes or lesions      ADMISSION SUMMARY  Patient is a 40y old Male who presents with a chief complaint of COVID (10 May 2021 11:02)  Currently admitted to medicine with the primary diagnosis of COVID-19      ASSESSMENT & PLAN  41 y/o no significant past medical history presenting for shortness of breath since 5/3 (tested positive for COVID outpatient 5/1? after he had headache), admitted for severe COVID PNA requiring HFNC to maintain O2 saturation.     #Severe COVID PNA with suspected superimposed bacterial infection   -f/u urine strep/legionella (pending), blood culture negative 5/6  ->139>69, ferritin 1353>751, procalcitonin 2.08>0.37, d-dimer 531>2700>2549>3087.   -Lovenox research trial   -ID followup -  ceftriaxone 2g q24hr and levaquin 500mg q24hr, s/p Toci x1, plan to continue abx until on RA per ID.  -CXR with extensive bilateral opacities  - c/w remdesivir (day 1 was 5/5), dexamethasone 6mg q24 (day 1 was 5/5)  - Wean O2 as tolerated , HFNC 60/50 saturating mid to high 90s -> plan to trial Nasal cannula today 5/11.   -f/u pulm - increased dexamethasone to 8mg q8 hrs 5/7.   -LE duplex 5/5 negative for dvt , repeat duplex negative   - urine legionella negative, urine strep negative  - blood culture negative 5/6    #Diet - regular   #GI ppx - pantoprazole  #DVT ppx - lovenox research trial  #Code status - full     Pending: f/u urine strep, inflammatory markers, wean o2.

## 2021-05-11 NOTE — PHARMACOTHERAPY INTERVENTION NOTE - COMMENTS
Patient Education     Toilet Training  What is toilet training?  Toilet training is teaching your child to recognize their body signals for urinating and having a bowel movement. It also means teaching your child to use a potty chair or toilet correctly and at the appropriate times.  When should toilet training start?  Toilet training should start when your child shows signs that he or she is ready. There is no right age to begin. If you try to toilet train before your child is ready, it can be a fields for both you and your child. The ability to control bowel and bladder muscles comes with proper growth and development.  Children develop at different rates. A child younger than 12 months has no control over bladder or bowel movements. There is very little control between 12 to 18 months. Most children don't have bowel and bladder control until 24 to 30 months. The average age of toilet training is 27 months.  Learning when my child is ready to start toilet training  The following may be signs that your child is ready to start toilet training. Your child should be able to:  · Walk well in order to get to the potty chair  · Tell you when there is a need to go to the potty  · Control the muscles used for going to the potty  Other signs that your child may be ready for toilet training include:  · Asks to have the diaper changed or tells you a bowel movement or urine is coming  · Shows discomfort when the diaper is wet or dirty  · Enjoys copying what parents or older children do  · Follows you into the bathroom to see how the toilet is used  · Wants to do things (such as going to the potty) to make parents happy or to get praise  · Has dry diapers for at least 2 hours during the day or is dry after naps or overnight  Getting started with toilet training  The following tips may help you get started with toilet training:  · If there are siblings, ask them to let the younger child see you praising them for using the  s/w dr Barone, per Bib/per dr pitts, pt needs to be on therapeutic dose of lovenox, patient dropped out of study lovenox, pt received 40 mg , tx dose is 130 mg q12h, informed md at A5 to order 90 mg x 1 dose tonight, starting 130 mg q12 tomorrow 5/12/21. toilet.  · It's best to use a potty chair on the floor rather than putting the child on the toilet for training. The potty chair is more secure for most children. Their feet reach the floor and there is no fear of falling off. If you decide to use a seat that goes over the toilet, use a footrest for your child's feet.  · Let your child play with the potty. They can sit on it with clothes on and later with diapers off. This way they can get used to it.  · Never strap your child to the potty chair. Children should be free to get off the potty when they want.  · Your child should not sit on the potty for more than 5 minutes. Sometimes children have a bowel movement just after the diaper is back on because the diaper feels normal. Don't get upset or punish your child. You can try taking the dirty diaper off and putting the bowel movement in the potty with your child watching you. This may help your child understand that you want the bowel movement in the potty.  · If your child has a normal time for bowel movements (such as after a meal), take your child to the potty at that time of day. If your child acts a certain way when having a bowel movement (such as stooping, getting quiet, going to the corner), try taking your child to potty when he or she shows it is time.  · If your child wants to sit on the potty, stay next to your child and talk or read a book.  · It's good to use words for what your child is doing (such as potty, pee, or poop). Then your child learns the words to tell you. Remember that other people will hear these words. Don't use words that will offend, confuse, or embarrass others or your child.  · Don't use words such as dirty, naughty, or stinky to describe bowel movements and urine. Use a simple, [id number] tone.  · If your child gets off the potty before urinating or passing a bowel movement, be calm. Don't scold. Try again later. If your child successfully uses the potty, give plenty of praise  such as a smile, clap, or hug.  · Children learn from copying adults and other children. It may help if your child sits on the potty chair while you are using the toilet.  · Children often follow parents into the bathroom. This may be one time they are willing to use the potty.  · Start out by teaching boys to sit down for passing urine. At first, it is hard to control starting and stopping while standing. Boys will try to stand to urinate when they see other boys standing.  · Some children learn by pretending to teach a doll to go potty. Get a doll that has a hole in its mouth and diaper area. Your child can feed and \"teach\" the doll to pull down its pants and use the potty. Make this teaching fun for your child.  · Make going to the potty a part of your child's daily routine. Do this first thing in the morning, after meals and naps, and before going to bed.  After training is started  The following tips may help you once the training is started:   · Once your child starts using the potty and can tell you they need to go, taking them to the potty at regular times or reminding them too many times to go to the potty is not necessary.  · You may want to start using training pants. Wearing underpants is a sign of growing up, and most children like being a \"big girl or big boy.\" Wearing diapers once potty training has been started may be confusing for your child.  · If your child has an accident while in training pants, don't punish. Be calm and clean up without making a fuss about it.  · Keep praising or rewarding your child every step of the way. Do this for pulling down pants, for sitting on the potty, and for using the potty. If you show that you are pleased when your child urinates or has bowel movements in the potty, your child is more likely to use the potty next time.  · As children get older, they can learn to wipe themselves and wash hands after going to the bathroom. Girls should be taught to wipe from front  to back so that germs from bowel movement are not wiped into the urinary area.  · Remember that every child is different and learns toilet training at his or her own pace. If things are going poorly with toilet training, it's better to put diapers back on for a few weeks and try again later. In general, have a calm, unhurried approach to toilet training.  · Most children have bowel control and daytime urine control by age 3 or 4. Soiling or daytime wetting after this age should be discussed with your child's healthcare provider.  · Nighttime control usually comes much later than daytime control. Complete nighttime control may not happen until your child is 4 or 5 years old, or even older. If your child is age 5 or older and does not stay dry at night, you should discuss this with your child's healthcare provider.  · Even when children are toilet trained, they may have some normal accidents (when excited or playing a lot), or setbacks due to illness or emotional situations. If accidents or setbacks happen, be patient. Examples of emotional situations include moving to a new house, a family illness or death, or a new baby in the house. In fact, if you know an emotional situation is going to be happening soon, don't start toilet training. Wait for a calmer time.  Books, videos, and more information on toilet training can be found at the library, bookstore, or online. Talk with your child's healthcare provider for more information.  [employer] last reviewed this educational content on 2018  © 5259-8656 [employer]. All rights reserved. This information is not intended as a substitute for professional medical care. Always follow your healthcare professional's instructions.             May use vaseline with each change on penis for comfort.

## 2021-05-11 NOTE — PROGRESS NOTE ADULT - ASSESSMENT
· Assessment	  39 y/o M with no PMH presents with SOB. Patient woke up May 1 with bad headache. His wife had tested positive for COVID, so he went to get tested and was positive. Today he noticed his SOB was worsening so he went get plasma, while there they noted he was saturating in low 80s so they did not give him plasma and sent him to the hospital.     IMPRESSION;  COVID 19 with severe illness. SpO2 < 94% on RA and need for supplemental O2.   Clinically significantly improved  CXR with reduced opacities  Inflammatory markers reduced with the exception of increasing ddimers and would be concerned about a PE  Pt was in the early viral replicative phase based on the timeline/onset of symptoms.  Resolving cytokine response/cytokine storm.  Excellent response to Toci    procalcitonin 2.08>0.37  Ferritin 1353>751  >69  Ddimers 531>2700>3087>5286    5/6 BCx NGTD  5/6 Legionella NG  5/7 S/p Toci  s/p RDV    RECOMMENDATIONS;  Target SpO2 92 % to 96 %  CT a to r/o PE  Rocephin 2 gm iv q24h  Levoquin 500 mg iv q24h  Dexamethasone 6 mg iv q24h for 10 days.  Monitor for side effects: hyperglycemia, neurological ( agitation/confusion), adrenal suppression, bacterial and fungal infections  will deescalate ABx once on RA

## 2021-05-11 NOTE — PROGRESS NOTE ADULT - ASSESSMENT
39 y/o obese Male without significant PMX presented to the ED with worsening SOB and Hypoxia, admitted to SDU with severe COVID PNA  Onset of symptoms 5/1    Acute hypoxemic respiratory failure secondary to COVID 19 PNA  Suspected superimposed bacterial PNA  Obesity  Transaminitis  transitioned to 6L NC today, feels well   on Dexamethasone 8mg Q8H, started 5/5  s/p Toci 5/7/2021 and RDV   Ddimer 531--->2700--->2549--->3000--->5286   LE duplex 5/5 without DVTs. Patient enrolled in lovenox study  Repeat LE duplex 5/9 without DVTs  Procal 2.08--->0.37, Urine legionella neg, urine strep neg   c/w Rocephin 2g Q24H and Levaquin 500 Q24H  encourage incentive spirometry   taper down supplemental oxygen as tolerated  trend inflammatory markers  pulm cc following  CHECK CTA CHEST to r/o PE given rising ddimer    Patient requires continuous monitoring in CEU .

## 2021-05-11 NOTE — PROGRESS NOTE ADULT - SUBJECTIVE AND OBJECTIVE BOX
DAGOBERTOMICHAEL  40y, Male    All available historical data reviewed    OVERNIGHT EVENTS:  no fevers  HFNC  feels well and has no complaints     ROS:  General: Denies rigors, nightsweats  HEENT: Denies headache, rhinorrhea, sore throat, eye pain  CV: Denies CP, palpitations  PULM: Denies wheezing, hemoptysis  GI: Denies hematemesis, hematochezia, melena  : Denies discharge, hematuria  MSK: Denies arthralgias, myalgias  SKIN: Denies rash, lesions  NEURO: Denies paresthesias, weakness  PSYCH: Denies depression, anxiety    VITALS:  T(F): 98.6, Max: 98.6 (05-10-21 @ 16:15)  HR: 83  BP: 143/89  RR: 18Vital Signs Last 24 Hrs  T(C): 37 (11 May 2021 13:00), Max: 37 (10 May 2021 16:15)  T(F): 98.6 (11 May 2021 13:00), Max: 98.6 (10 May 2021 16:15)  HR: 83 (11 May 2021 13:00) (71 - 83)  BP: 143/89 (11 May 2021 13:00) (118/73 - 155/90)  BP(mean): --  RR: 18 (11 May 2021 13:00) (18 - 20)  SpO2: 93% (11 May 2021 13:00) (93% - 98%)    TESTS & MEASUREMENTS:                        13.0   11.52 )-----------( 264      ( 11 May 2021 09:36 )             37.9     05-11    133<L>  |  96<L>  |  17  ----------------------------<  135<H>  4.0   |  22  |  0.5<L>    Ca    9.0      11 May 2021 09:36  Mg     2.1     05-11    TPro  6.5  /  Alb  3.8  /  TBili  0.4  /  DBili  x   /  AST  33  /  ALT  61<H>  /  AlkPhos  105  05-11    LIVER FUNCTIONS - ( 11 May 2021 09:36 )  Alb: 3.8 g/dL / Pro: 6.5 g/dL / ALK PHOS: 105 U/L / ALT: 61 U/L / AST: 33 U/L / GGT: x             Culture - Blood (collected 05-06-21 @ 08:28)  Source: .Blood None  Preliminary Report (05-07-21 @ 18:01):    No growth to date.            RADIOLOGY & ADDITIONAL TESTS:  Personal review of radiological diagnostics performed  Echo and EKG results noted when applicable.     MEDICATIONS:  aluminum hydroxide/magnesium hydroxide/simethicone Suspension 30 milliLiter(s) Oral every 4 hours PRN  chlorhexidine 4% Liquid 1 Application(s) Topical <User Schedule>  dexAMETHasone  IVPB 8 milliGRAM(s) IV Intermittent every 8 hours  enoxaparin Study Injectable () 1 Dose(s) SubCutaneous two times a day  levoFLOXacin IVPB      levoFLOXacin IVPB 500 milliGRAM(s) IV Intermittent every 24 hours  pantoprazole    Tablet 40 milliGRAM(s) Oral before breakfast      ANTIBIOTICS:  levoFLOXacin IVPB      levoFLOXacin IVPB 500 milliGRAM(s) IV Intermittent every 24 hours

## 2021-05-12 LAB
ALBUMIN SERPL ELPH-MCNC: 3.8 G/DL — SIGNIFICANT CHANGE UP (ref 3.5–5.2)
ALP SERPL-CCNC: 100 U/L — SIGNIFICANT CHANGE UP (ref 30–115)
ALT FLD-CCNC: 62 U/L — HIGH (ref 0–41)
ANION GAP SERPL CALC-SCNC: 13 MMOL/L — SIGNIFICANT CHANGE UP (ref 7–14)
AST SERPL-CCNC: 32 U/L — SIGNIFICANT CHANGE UP (ref 0–41)
BASOPHILS # BLD AUTO: 0.03 K/UL — SIGNIFICANT CHANGE UP (ref 0–0.2)
BASOPHILS NFR BLD AUTO: 0.2 % — SIGNIFICANT CHANGE UP (ref 0–1)
BILIRUB SERPL-MCNC: 0.5 MG/DL — SIGNIFICANT CHANGE UP (ref 0.2–1.2)
BUN SERPL-MCNC: 20 MG/DL — SIGNIFICANT CHANGE UP (ref 10–20)
CALCIUM SERPL-MCNC: 8.9 MG/DL — SIGNIFICANT CHANGE UP (ref 8.5–10.1)
CHLORIDE SERPL-SCNC: 100 MMOL/L — SIGNIFICANT CHANGE UP (ref 98–110)
CO2 SERPL-SCNC: 25 MMOL/L — SIGNIFICANT CHANGE UP (ref 17–32)
CREAT SERPL-MCNC: 0.6 MG/DL — LOW (ref 0.7–1.5)
D DIMER BLD IA.RAPID-MCNC: 3338 NG/ML DDU — HIGH (ref 0–230)
EOSINOPHIL # BLD AUTO: 0 K/UL — SIGNIFICANT CHANGE UP (ref 0–0.7)
EOSINOPHIL NFR BLD AUTO: 0 % — SIGNIFICANT CHANGE UP (ref 0–8)
GLUCOSE SERPL-MCNC: 104 MG/DL — HIGH (ref 70–99)
HCT VFR BLD CALC: 38.9 % — LOW (ref 42–52)
HGB BLD-MCNC: 12.9 G/DL — LOW (ref 14–18)
IMM GRANULOCYTES NFR BLD AUTO: 5.5 % — HIGH (ref 0.1–0.3)
LYMPHOCYTES # BLD AUTO: 0.75 K/UL — LOW (ref 1.2–3.4)
LYMPHOCYTES # BLD AUTO: 5.9 % — LOW (ref 20.5–51.1)
MAGNESIUM SERPL-MCNC: 2.2 MG/DL — SIGNIFICANT CHANGE UP (ref 1.8–2.4)
MCHC RBC-ENTMCNC: 27.2 PG — SIGNIFICANT CHANGE UP (ref 27–31)
MCHC RBC-ENTMCNC: 33.2 G/DL — SIGNIFICANT CHANGE UP (ref 32–37)
MCV RBC AUTO: 81.9 FL — SIGNIFICANT CHANGE UP (ref 80–94)
MONOCYTES # BLD AUTO: 0.73 K/UL — HIGH (ref 0.1–0.6)
MONOCYTES NFR BLD AUTO: 5.8 % — SIGNIFICANT CHANGE UP (ref 1.7–9.3)
NEUTROPHILS # BLD AUTO: 10.46 K/UL — HIGH (ref 1.4–6.5)
NEUTROPHILS NFR BLD AUTO: 82.6 % — HIGH (ref 42.2–75.2)
NRBC # BLD: 0 /100 WBCS — SIGNIFICANT CHANGE UP (ref 0–0)
PLATELET # BLD AUTO: 276 K/UL — SIGNIFICANT CHANGE UP (ref 130–400)
POTASSIUM SERPL-MCNC: 4.9 MMOL/L — SIGNIFICANT CHANGE UP (ref 3.5–5)
POTASSIUM SERPL-SCNC: 4.9 MMOL/L — SIGNIFICANT CHANGE UP (ref 3.5–5)
PROT SERPL-MCNC: 6.5 G/DL — SIGNIFICANT CHANGE UP (ref 6–8)
RBC # BLD: 4.75 M/UL — SIGNIFICANT CHANGE UP (ref 4.7–6.1)
RBC # FLD: 12.5 % — SIGNIFICANT CHANGE UP (ref 11.5–14.5)
SODIUM SERPL-SCNC: 138 MMOL/L — SIGNIFICANT CHANGE UP (ref 135–146)
WBC # BLD: 12.67 K/UL — HIGH (ref 4.8–10.8)
WBC # FLD AUTO: 12.67 K/UL — HIGH (ref 4.8–10.8)

## 2021-05-12 PROCEDURE — 93306 TTE W/DOPPLER COMPLETE: CPT | Mod: 26

## 2021-05-12 PROCEDURE — 99233 SBSQ HOSP IP/OBS HIGH 50: CPT

## 2021-05-12 RX ORDER — DEXAMETHASONE 0.5 MG/5ML
8 ELIXIR ORAL EVERY 12 HOURS
Refills: 0 | Status: DISCONTINUED | OUTPATIENT
Start: 2021-05-12 | End: 2021-05-13

## 2021-05-12 RX ADMIN — ENOXAPARIN SODIUM 130 MILLIGRAM(S): 100 INJECTION SUBCUTANEOUS at 05:39

## 2021-05-12 RX ADMIN — PANTOPRAZOLE SODIUM 40 MILLIGRAM(S): 20 TABLET, DELAYED RELEASE ORAL at 05:39

## 2021-05-12 RX ADMIN — Medication 101.6 MILLIGRAM(S): at 17:11

## 2021-05-12 RX ADMIN — Medication 101.6 MILLIGRAM(S): at 05:39

## 2021-05-12 RX ADMIN — ENOXAPARIN SODIUM 130 MILLIGRAM(S): 100 INJECTION SUBCUTANEOUS at 17:10

## 2021-05-12 NOTE — DIETITIAN INITIAL EVALUATION ADULT. - PERSON TAUGHT/METHOD
nutrition therapy for wt loss/management/general healthful diet discussed./verbal instruction/patient instructed/teach back - (Patient repeats in own words)

## 2021-05-12 NOTE — DIETITIAN INITIAL EVALUATION ADULT. - ORAL INTAKE PTA/DIET HISTORY
Pt reports having an excellent appetite/po intake at baseline. He reports that he is interested in following a more general healthful diet/lifestyle when d/c. Discussed nutrition therapy for wt loss/management. Pt verbalizes understand + denies the need for additional resources/handouts. NKFA/intolerances. No cultural/Caodaism food preferences. No vitamins/supplements pta. No chewing/swallowing issues.

## 2021-05-12 NOTE — DIETITIAN INITIAL EVALUATION ADULT. - OTHER INFO
Pt admitted d/t COVID 19 hypoxia. Pt transitioned to O2 via NC; desaturates with ambulation, therefore will need home O2 upon d/c. Acute pulmonary embolism; pt to continue abx until able to tolerated room air- per ID.

## 2021-05-12 NOTE — CHART NOTE - NSCHARTNOTEFT_GEN_A_CORE
ceu Transfer Note  ---------------------------    Transfer from: ceu   Transfer to:  ( x ) Medicine    (  ) Telemetry    (  ) RCU    (  ) Palliative    (  ) Stroke Unit    (  ) _______________  Accepting Physician:      HPI  39 y/o M, obese with no PMH presents with SOB. Patient woke up May 1 with bad headache. His wife had tested positive for COVID, so he went to get tested and was positive. Today he noticed his SOB was worsening so he went get plasma, while there they noted he was saturating in low 80s so they did not give him plasma and sent him to the hospital. He denies chest pain, leg pain, abdominal pain, fever, chills or any other symptoms.    ED course: T(F): 99.1, HR: 91, BP: 130/78, RR: 20, SpO2: 82% on 5L. Xray showed diffuse b/l patchy opacities. COVID positive. High flow at 60/100%, saturating 98%. He received ceftriaxone and azithromycin and is admitted to ICU/COVID ICU/CEU with diagnosis of COVID pneumonia for close monitoring.    CEU/COVID ICu course:     Antibiotics were adjusted for suspected superimposed pneumonia per ID - ceftriaxone and levaquin, given toci due to elevated inflammatory markers, patient was given remdesivir, dexamethasone increased to 8mg q 8hours on 5/7, patient was placed on bipap and hfnc alternating. due to increased d dimer, duplexes were performed (negative) and CT angio was performed 5/11 , confirming bilateral lower segmental PE (acute), at which point patient was placed on therapeutic lovenox (patient was enrolled in lovenox trial - see research communication notes).  Patient status improved, o2 requirements were weaned to 2L nasal cannula and stable for downgrade to medical floor.               OBJECTIVE --  Vital Signs Last 24 Hrs  T(C): 37 (12 May 2021 07:35), Max: 37 (11 May 2021 13:00)  T(F): 98.6 (12 May 2021 07:35), Max: 98.6 (11 May 2021 13:00)  HR: 87 (12 May 2021 07:35) (72 - 87)  BP: 146/63 (12 May 2021 07:35) (120/81 - 146/63)  BP(mean): --  RR: 20 (12 May 2021 07:35) (18 - 20)  SpO2: 95% (12 May 2021 07:35) (93% - 95%)    I&O's Summary    11 May 2021 07:01  -  12 May 2021 07:00  --------------------------------------------------------  IN: 100 mL / OUT: 0 mL / NET: 100 mL        MEDICATIONS  (STANDING):  chlorhexidine 4% Liquid 1 Application(s) Topical <User Schedule>  dexAMETHasone  IVPB 8 milliGRAM(s) IV Intermittent every 12 hours  enoxaparin Injectable 130 milliGRAM(s) SubCutaneous every 12 hours  levoFLOXacin IVPB      levoFLOXacin IVPB 500 milliGRAM(s) IV Intermittent every 24 hours  pantoprazole    Tablet 40 milliGRAM(s) Oral before breakfast    MEDICATIONS  (PRN):  aluminum hydroxide/magnesium hydroxide/simethicone Suspension 30 milliLiter(s) Oral every 4 hours PRN Dyspepsia        LABS                                            12.9                  Neurophils% (auto):   82.6   (05-12 @ 08:10):    12.67)-----------(276          Lymphocytes% (auto):  5.9                                           38.9                   Eosinphils% (auto):   0.0      Manual%: Neutrophils x    ; Lymphocytes x    ; Eosinophils x    ; Bands%: x    ; Blasts x                                    138    |  100    |  20                  Calcium: 8.9   / iCa: x      (05-12 @ 08:10)    ----------------------------<  104       Magnesium: 2.2                              4.9     |  25     |  0.6              Phosphorous: x        TPro  6.5    /  Alb  3.8    /  TBili  0.5    /  DBili  x      /  AST  32     /  ALT  62     /  AlkPhos  100    12 May 2021 08:10            ASSESSMENT & PLAN:     39 y/o no significant past medical history presenting for shortness of breath since 5/3 (tested positive for COVID outpatient 5/1? after he had headache), admitted for severe COVID PNA requiring HFNC to maintain O2 saturation.     #Severe COVID PNA with suspected superimposed bacterial infection   #acute pulmonary embolism  -f/u urine strep/legionella (pending), blood culture negative 5/6  ->139>69, ferritin 1353>751, procalcitonin 2.08>0.37, d-dimer 531>2700>2549>3087.   -Lovenox research trial   -ID followup -  ceftriaxone 2g q24hr and levaquin 500mg q24hr, s/p Toci x1, plan to continue abx until on RA per ID.  -CXR with extensive bilateral opacities  - c/w remdesivir (day 1 was 5/5), dexamethasone 6mg q24 (day 1 was 5/5)  - Wean O2 as tolerated , on NC 2L saturating mid 90s. desaturated with ambulation will need home O2.    -f/u pulm - increased dexamethasone to 8mg q8 hrs 5/7. - taper to 8q12 5/12, continue to taper on discharge.    -LE duplex 5/5 negative for dvt , repeat duplex negative,   - urine legionella negative, urine strep negative  - blood culture negative 5/6  - d dimer increasing, CT angio PE 5/11 with bilateral lower lobe segmental PE, can order echo to r/o heart strain, patient started on therapeutic lovenox, switch to eliquis tomorrow.     #Diet - regular   #GI ppx - pantoprazole  #DVT ppx - lovenox research trial  #Code status - full     Pending: wean O2 as tolerated, eliquis on discharge, downgrade to medical floor.         For Follow-Up:  [ ] wean o2,   [ ] taper steroids   [ ] eliquis on discharge
Despite treatment of COVID with steroids, suspected bacterial infection with antibiotics, and PE with anticoagulation , patient continues to be hypoxic due to COVID-19 pneumonia/pulmonary embolism.     Patient pulse ox on room air @ rest = 94%, and 83% on ambulation  Patient pulse ox 93% with 2L NC on ambulation.     Mr Guzman was tested in a chronic, stable state, patient is aware he will need oxygen to go home with.   Patient is okay with pulse dose for a portable concentrator.

## 2021-05-12 NOTE — PROGRESS NOTE ADULT - SUBJECTIVE AND OBJECTIVE BOX
MICHAEL ARENAS  40y Male    CHIEF COMPLAINT:    Patient is a 40y old  Male who presents with a chief complaint of COVID (12 May 2021 09:58)      INTERVAL HPI/OVERNIGHT EVENTS:    Patient seen and examined. No acute events overnight. Supplemental oxygen requirements improving     ROS: All other systems are negative.    Vital Signs:    T(F): 98.6 (05-12-21 @ 07:35), Max: 98.6 (05-11-21 @ 13:00)  HR: 87 (05-12-21 @ 07:35) (72 - 87)  BP: 146/63 (05-12-21 @ 07:35) (120/81 - 146/63)  RR: 20 (05-12-21 @ 07:35) (18 - 20)  SpO2: 95% (05-12-21 @ 07:35) (93% - 95%)    11 May 2021 07:01  -  12 May 2021 07:00  --------------------------------------------------------  IN: 100 mL / OUT: 0 mL / NET: 100 mL    PHYSICAL EXAM:    GENERAL:  NAD  SKIN: No rashes or lesions  HEENT: Atraumatic. Normocephalic.   NECK: Supple, No JVD.    PULMONARY: CTA B/L. No wheezing.    CVS: Normal S1, S2. Rate and Rhythm are regular.   ABDOMEN/GI: Soft, Nontender, Nondistended; BS present  MSK: No clubbing or cyanosis  NEUROLOGIC: moves all extremities  PSYCH: Alert & oriented x 3, normal affect    Consultant(s) Notes Reviewed:  [x ] YES  [ ] NO  Care Discussed with Consultants/Other Providers [ x] YES  [ ] NO    LABS:                        12.9   12.67 )-----------( 276      ( 12 May 2021 08:10 )             38.9     138  |  100  |  20  ----------------------------<  104<H>  4.9   |  25  |  0.6<L>    Ca    8.9      12 May 2021 08:10  Mg     2.2     05-12    TPro  6.5  /  Alb  3.8  /  TBili  0.5  /  DBili  x   /  AST  32  /  ALT  62<H>  /  AlkPhos  100  05-12    RADIOLOGY & ADDITIONAL TESTS:  Imaging or report Personally Reviewed:  [x] YES  [ ] NO  EKG reviewed: [x] YES  [ ] NO    Medications:  Standing  chlorhexidine 4% Liquid 1 Application(s) Topical <User Schedule>  dexAMETHasone  IVPB 8 milliGRAM(s) IV Intermittent every 8 hours  enoxaparin Injectable 130 milliGRAM(s) SubCutaneous every 12 hours  levoFLOXacin IVPB      levoFLOXacin IVPB 500 milliGRAM(s) IV Intermittent every 24 hours  pantoprazole    Tablet 40 milliGRAM(s) Oral before breakfast    PRN Meds  aluminum hydroxide/magnesium hydroxide/simethicone Suspension 30 milliLiter(s) Oral every 4 hours PRN

## 2021-05-12 NOTE — DIETITIAN INITIAL EVALUATION ADULT. - FACTORS AFF FOOD INTAKE
Pt reports that his appetite has been as good as it normally is. He states that family has been bringing him food from home every day. No nausea/abdominal pain with meals. No issues with diarrhea or constipation. Last BM 5/11./none

## 2021-05-12 NOTE — PROGRESS NOTE ADULT - ASSESSMENT
39 y/o obese Male without significant PMX presented to the ED with worsening SOB and Hypoxia, admitted to SDU with severe COVID PNA  Onset of symptoms 5/1    Acute hypoxemic respiratory failure secondary to COVID 19 PNA  Suspected superimposed bacterial PNA  PE  Obesity  Transaminitis  Supplemental oxygen requirements decreasing, currently on 2-3L NC saturating 90s   decrease dexamethasone to 8mg Q12H, steroids started 5/5  s/p Toci 5/7/2021 and RDV   Ddimer 531--->2700--->2549--->3000--->5286   LE duplex 5/5 and 5/9 without DVTs. Patient initially enrolled in lovenox study  CTA chest 5/11 with b/l PEs, now on therapeutic lovenox  Procal 2.08--->0.37, Urine legionella neg, urine strep neg   c/w Rocephin 2g Q24H and Levaquin 500 Q24H, ID following   encourage incentive spirometry   taper down supplemental oxygen as tolerated  trend inflammatory markers  pulm cc following  check 2d echo    #Progress Note Handoff  Pending (specify):   2d echo, tapering down steroids, ID f./u for po abx when ready for dc, CM working on home o2  Family discussion: Plan of care discussed with patient, aware and agreeable   Disposition:  anticipate dc home in 24-48 hours    Torrie Vazquez MD  s. [phone]

## 2021-05-12 NOTE — DIETITIAN INITIAL EVALUATION ADULT. - PHYSCIAL ASSESSMENT
obese Pt denies any recent changes in wt at this time. No wt loss. Stable wt trend noted throughout current admission.    (5/7) +2 edema (L/R knees/ankles/feet)- remains active per flow sheets; skin WDL (5/12).

## 2021-05-12 NOTE — PROGRESS NOTE ADULT - SUBJECTIVE AND OBJECTIVE BOX
MICHAEL ARENAS 40y Male  MRN#: [id number]   CODE STATUS:____full      SUBJECTIVE  Patient is a 40y old Male who presents with a chief complaint of COVID (12 May 2021 09:35)  Currently admitted to medicine with the primary diagnosis of COVID-19      Today is hospital day 7d, and this morning he is feeling okay and denies any issues overnight or other complaints  other than shortness of breath with ambulation.         OBJECTIVE  PAST MEDICAL & SURGICAL HISTORY  No pertinent past medical history    No significant past surgical history      ALLERGIES:  No Known Allergies    MEDICATIONS:  STANDING MEDICATIONS  chlorhexidine 4% Liquid 1 Application(s) Topical <User Schedule>  dexAMETHasone  IVPB 8 milliGRAM(s) IV Intermittent every 8 hours  enoxaparin Injectable 130 milliGRAM(s) SubCutaneous every 12 hours  levoFLOXacin IVPB      levoFLOXacin IVPB 500 milliGRAM(s) IV Intermittent every 24 hours  pantoprazole    Tablet 40 milliGRAM(s) Oral before breakfast    PRN MEDICATIONS  aluminum hydroxide/magnesium hydroxide/simethicone Suspension 30 milliLiter(s) Oral every 4 hours PRN      VITAL SIGNS: Last 24 Hours  T(C): 37 (12 May 2021 07:35), Max: 37 (11 May 2021 13:00)  T(F): 98.6 (12 May 2021 07:35), Max: 98.6 (11 May 2021 13:00)  HR: 87 (12 May 2021 07:35) (72 - 87)  BP: 146/63 (12 May 2021 07:35) (120/81 - 146/63)  BP(mean): --  RR: 20 (12 May 2021 07:35) (18 - 20)  SpO2: 95% (12 May 2021 07:35) (93% - 95%)    LABS:                        12.9   12.67 )-----------( 276      ( 12 May 2021 08:10 )             38.9     05-12    138  |  100  |  20  ----------------------------<  104<H>  4.9   |  25  |  0.6<L>    Ca    8.9      12 May 2021 08:10  Mg     2.2     05-12    TPro  6.5  /  Alb  3.8  /  TBili  0.5  /  DBili  x   /  AST  32  /  ALT  62<H>  /  AlkPhos  100  05-12          RADIOLOGY:  < from: CT Angio Chest PE Protocol w/ IV Cont (05.11.21 @ 19:14) >  IMPRESSION:    Acute bilateral lower lobe segmental pulmonary emboli without evidence of right heart strain.    Diffuse bilateral groundglass opacities, compatible with reported diagnosis of COVID pneumonia.      < end of copied text >      PHYSICAL EXAM:    GENERAL: NAD, well-developed, AAOx3  HEENT:  Atraumatic, Normocephalic. EOMI, PERRLA  PULMONARY: bilateral rhonchi improving, no wheeze  CARDIOVASCULAR: Regular rate and rhythm; No murmurs, rubs, or gallops  GASTROINTESTINAL: Soft, Nontender, Nondistended; Bowel sounds present  MUSCULOSKELETAL:  2+ Peripheral Pulses, No clubbing, cyanosis. mild bilateral pedal edema.  NEUROLOGY: non-focal  SKIN: No rashes or lesions      ADMISSION SUMMARY  Patient is a 40y old Male who presents with a chief complaint of COVID (12 May 2021 09:35)  Currently admitted to medicine with the primary diagnosis of COVID-19    Hospital course has been complicated by _______.       ASSESSMENT & PLAN  41 y/o no significant past medical history presenting for shortness of breath since 5/3 (tested positive for COVID outpatient 5/1? after he had headache), admitted for severe COVID PNA requiring HFNC to maintain O2 saturation.     #Severe COVID PNA with suspected superimposed bacterial infection   #acute pulmonary embolism  -f/u urine strep/legionella (pending), blood culture negative 5/6  ->139>69, ferritin 1353>751, procalcitonin 2.08>0.37, d-dimer 531>2700>2549>3087.   -Lovenox research trial   -ID followup -  ceftriaxone 2g q24hr and levaquin 500mg q24hr, s/p Toci x1, plan to continue abx until on RA per ID.  -CXR with extensive bilateral opacities  - c/w remdesivir (day 1 was 5/5), dexamethasone 6mg q24 (day 1 was 5/5)  - Wean O2 as tolerated , on NC 2L saturating mid 90s. desaturated with ambulation will need home O2.    -f/u pulm - increased dexamethasone to 8mg q8 hrs 5/7.   -LE duplex 5/5 negative for dvt , repeat duplex negative,   - urine legionella negative, urine strep negative  - blood culture negative 5/6  - d dimer increasing, CT angio PE 5/11 with bilateral lower lobe segmental PE, can order echo to r/o heart strain, patient started on therapeutic lovenox, switch to eliquis tomorrow.     #Diet - regular   #GI ppx - pantoprazole  #DVT ppx - lovenox research trial  #Code status - full     Pending: wean O2 as tolerated, eliquis on discharge, downgrade to medical floor.              MICHAEL ARENAS 40y Male  MRN#: [id number]   CODE STATUS:____full      SUBJECTIVE  Patient is a 40y old Male who presents with a chief complaint of COVID (12 May 2021 09:35)  Currently admitted to medicine with the primary diagnosis of COVID-19      Today is hospital day 7d, and this morning he is feeling okay and denies any issues overnight or other complaints  other than shortness of breath with ambulation.         OBJECTIVE  PAST MEDICAL & SURGICAL HISTORY  No pertinent past medical history    No significant past surgical history      ALLERGIES:  No Known Allergies    MEDICATIONS:  STANDING MEDICATIONS  chlorhexidine 4% Liquid 1 Application(s) Topical <User Schedule>  dexAMETHasone  IVPB 8 milliGRAM(s) IV Intermittent every 8 hours  enoxaparin Injectable 130 milliGRAM(s) SubCutaneous every 12 hours  levoFLOXacin IVPB      levoFLOXacin IVPB 500 milliGRAM(s) IV Intermittent every 24 hours  pantoprazole    Tablet 40 milliGRAM(s) Oral before breakfast    PRN MEDICATIONS  aluminum hydroxide/magnesium hydroxide/simethicone Suspension 30 milliLiter(s) Oral every 4 hours PRN      VITAL SIGNS: Last 24 Hours  T(C): 37 (12 May 2021 07:35), Max: 37 (11 May 2021 13:00)  T(F): 98.6 (12 May 2021 07:35), Max: 98.6 (11 May 2021 13:00)  HR: 87 (12 May 2021 07:35) (72 - 87)  BP: 146/63 (12 May 2021 07:35) (120/81 - 146/63)  BP(mean): --  RR: 20 (12 May 2021 07:35) (18 - 20)  SpO2: 95% (12 May 2021 07:35) (93% - 95%)    LABS:                        12.9   12.67 )-----------( 276      ( 12 May 2021 08:10 )             38.9     05-12    138  |  100  |  20  ----------------------------<  104<H>  4.9   |  25  |  0.6<L>    Ca    8.9      12 May 2021 08:10  Mg     2.2     05-12    TPro  6.5  /  Alb  3.8  /  TBili  0.5  /  DBili  x   /  AST  32  /  ALT  62<H>  /  AlkPhos  100  05-12          RADIOLOGY:  < from: CT Angio Chest PE Protocol w/ IV Cont (05.11.21 @ 19:14) >  IMPRESSION:    Acute bilateral lower lobe segmental pulmonary emboli without evidence of right heart strain.    Diffuse bilateral groundglass opacities, compatible with reported diagnosis of COVID pneumonia.      < end of copied text >      PHYSICAL EXAM:    GENERAL: NAD, well-developed, AAOx3  HEENT:  Atraumatic, Normocephalic. EOMI, PERRLA  PULMONARY: bilateral rhonchi improving, no wheeze  CARDIOVASCULAR: Regular rate and rhythm; No murmurs, rubs, or gallops  GASTROINTESTINAL: Soft, Nontender, Nondistended; Bowel sounds present  MUSCULOSKELETAL:  2+ Peripheral Pulses, No clubbing, cyanosis. mild bilateral pedal edema.  NEUROLOGY: non-focal  SKIN: No rashes or lesions      ADMISSION SUMMARY  Patient is a 40y old Male who presents with a chief complaint of COVID (12 May 2021 09:35)  Currently admitted to medicine with the primary diagnosis of COVID-19    Hospital course has been complicated by _______.       ASSESSMENT & PLAN  39 y/o no significant past medical history presenting for shortness of breath since 5/3 (tested positive for COVID outpatient 5/1? after he had headache), admitted for severe COVID PNA requiring HFNC to maintain O2 saturation.     #Severe COVID PNA with suspected superimposed bacterial infection   #acute pulmonary embolism  -f/u urine strep/legionella (pending), blood culture negative 5/6  ->139>69, ferritin 1353>751, procalcitonin 2.08>0.37, d-dimer 531>2700>2549>3087.   -Lovenox research trial   -ID followup -  ceftriaxone 2g q24hr and levaquin 500mg q24hr, s/p Toci x1, plan to continue abx until on RA per ID.  -CXR with extensive bilateral opacities  - c/w remdesivir (day 1 was 5/5), dexamethasone 6mg q24 (day 1 was 5/5)  - Wean O2 as tolerated , on NC 2L saturating mid 90s. desaturated with ambulation will need home O2.    -f/u pulm - increased dexamethasone to 8mg q8 hrs 5/7. - taper to 8q12 5/12, continue to taper on discharge.    -LE duplex 5/5 negative for dvt , repeat duplex negative,   - urine legionella negative, urine strep negative  - blood culture negative 5/6  - d dimer increasing, CT angio PE 5/11 with bilateral lower lobe segmental PE, can order echo to r/o heart strain, patient started on therapeutic lovenox, switch to eliquis tomorrow.     #Diet - regular   #GI ppx - pantoprazole  #DVT ppx - lovenox research trial  #Code status - full     Pending: wean O2 as tolerated, eliquis on discharge, downgrade to medical floor.

## 2021-05-12 NOTE — DIETITIAN INITIAL EVALUATION ADULT. - REASON INDICATOR FOR ASSESSMENT
LOS; unable to conduct face to face interview/NFPE r/t limited contact precautions in the setting of COVID19, nutrition hx obtained via pt @ [phone] for this reason.

## 2021-05-12 NOTE — PROGRESS NOTE ADULT - SUBJECTIVE AND OBJECTIVE BOX
MICHAEL ARENAS  40y, Male    All available historical data reviewed    OVERNIGHT EVENTS:  no fevers  95%NC 4 LIT    ROS:  General: Denies rigors, nightsweats  HEENT: Denies headache, rhinorrhea, sore throat, eye pain  CV: Denies CP, palpitations  PULM: Denies wheezing, hemoptysis  GI: Denies hematemesis, hematochezia, melena  : Denies discharge, hematuria  MSK: Denies arthralgias, myalgias  SKIN: Denies rash, lesions  NEURO: Denies paresthesias, weakness  PSYCH: Denies depression, anxiety    VITALS:  T(F): 98.6, Max: 98.6 (05-11-21 @ 13:00)  HR: 87  BP: 146/63  RR: 20Vital Signs Last 24 Hrs  T(C): 37 (12 May 2021 07:35), Max: 37 (11 May 2021 13:00)  T(F): 98.6 (12 May 2021 07:35), Max: 98.6 (11 May 2021 13:00)  HR: 87 (12 May 2021 07:35) (72 - 87)  BP: 146/63 (12 May 2021 07:35) (120/81 - 146/63)  BP(mean): --  RR: 20 (12 May 2021 07:35) (18 - 20)  SpO2: 95% (12 May 2021 07:35) (93% - 95%)    TESTS & MEASUREMENTS:                        12.9   12.67 )-----------( 276      ( 12 May 2021 08:10 )             38.9     05-12    138  |  100  |  20  ----------------------------<  104<H>  4.9   |  25  |  0.6<L>    Ca    8.9      12 May 2021 08:10  Mg     2.2     05-12    TPro  6.5  /  Alb  3.8  /  TBili  0.5  /  DBili  x   /  AST  32  /  ALT  62<H>  /  AlkPhos  100  05-12    LIVER FUNCTIONS - ( 12 May 2021 08:10 )  Alb: 3.8 g/dL / Pro: 6.5 g/dL / ALK PHOS: 100 U/L / ALT: 62 U/L / AST: 32 U/L / GGT: x             Culture - Blood (collected 05-06-21 @ 08:28)  Source: .Blood None  Final Report (05-11-21 @ 18:00):    No Growth Final            RADIOLOGY & ADDITIONAL TESTS:  Personal review of radiological diagnostics performed  Echo and EKG results noted when applicable.     MEDICATIONS:  aluminum hydroxide/magnesium hydroxide/simethicone Suspension 30 milliLiter(s) Oral every 4 hours PRN  chlorhexidine 4% Liquid 1 Application(s) Topical <User Schedule>  dexAMETHasone  IVPB 8 milliGRAM(s) IV Intermittent every 8 hours  enoxaparin Injectable 130 milliGRAM(s) SubCutaneous every 12 hours  levoFLOXacin IVPB      levoFLOXacin IVPB 500 milliGRAM(s) IV Intermittent every 24 hours  pantoprazole    Tablet 40 milliGRAM(s) Oral before breakfast      ANTIBIOTICS:  levoFLOXacin IVPB      levoFLOXacin IVPB 500 milliGRAM(s) IV Intermittent every 24 hours

## 2021-05-12 NOTE — PROGRESS NOTE ADULT - ASSESSMENT
· Assessment	  39 y/o M with no PMH presents with SOB. Patient woke up May 1 with bad headache. His wife had tested positive for COVID, so he went to get tested and was positive. Today he noticed his SOB was worsening so he went get plasma, while there they noted he was saturating in low 80s so they did not give him plasma and sent him to the hospital.     IMPRESSION;  COVID 19 with severe illness. SpO2 < 94% on RA and need for supplemental O2.   Clinically significantly improved despite PEs  CXR with reduced opacities  Inflammatory markers reduced with the exception of increasing ddimers and would be concerned about a PE  Pt was in the early viral replicative phase based on the timeline/onset of symptoms.  Resolving cytokine response/cytokine storm.  Excellent response to Toci  5/11 CT : acute b/l LL PEs    procalcitonin 2.08>0.37  Ferritin 1353>751  >69  Ddimers 531>2700>3087>5286    5/6 BCx NGTD  5/6 Legionella NG  5/7 S/p Toci  s/p RDV    RECOMMENDATIONS;  Target SpO2 92 % to 96 %  Rocephin 2 gm iv q24h  Levoquin 500 mg iv q24h  Dexamethasone 6 mg iv q24h for 10 days.  Monitor for side effects: hyperglycemia, neurological ( agitation/confusion), adrenal suppression, bacterial and fungal infections  Anticoagulation as per team.

## 2021-05-12 NOTE — DIETITIAN INITIAL EVALUATION ADULT. - RD TO REMAIN AVAILABLE
INTERVENTION: meals and snacks, coordination of care. ME: RD to monitor diet order, energy intake, body composition, NFPF, glucose/renal profiles/yes

## 2021-05-12 NOTE — RESEARCH COMMUNICATION NOTE - NS AS RESEARCH COMMUNICATION NOTE FT
Patient noted with diagnosis of PE on CT scan. He is currently receiving open-label therapeutic dose of Low-Molecular Weight Heparin (Lovenox) as per primary team.

## 2021-05-12 NOTE — DIETITIAN INITIAL EVALUATION ADULT. - OTHER CALCULATIONS
wt used: dosing 127 kg vs IBW 72.5 kg; Kcal: 2503-1808 kcal/d (MSJ x 1-1.1 AF) obese BMI considered; Protein: 87-94 g (1.2-1.3 g/kg IBW) same as above + significant difference between CBW/IBW considered; Fluid: per CEU team.

## 2021-05-13 ENCOUNTER — TRANSCRIPTION ENCOUNTER (OUTPATIENT)
Age: 41
End: 2021-05-13

## 2021-05-13 LAB
ALBUMIN SERPL ELPH-MCNC: 3.9 G/DL — SIGNIFICANT CHANGE UP (ref 3.5–5.2)
ALP SERPL-CCNC: 97 U/L — SIGNIFICANT CHANGE UP (ref 30–115)
ALT FLD-CCNC: 79 U/L — HIGH (ref 0–41)
ANION GAP SERPL CALC-SCNC: 14 MMOL/L — SIGNIFICANT CHANGE UP (ref 7–14)
ANISOCYTOSIS BLD QL: SIGNIFICANT CHANGE UP
AST SERPL-CCNC: 37 U/L — SIGNIFICANT CHANGE UP (ref 0–41)
BASOPHILS # BLD AUTO: 0 K/UL — SIGNIFICANT CHANGE UP (ref 0–0.2)
BASOPHILS NFR BLD AUTO: 0 % — SIGNIFICANT CHANGE UP (ref 0–1)
BILIRUB SERPL-MCNC: 0.5 MG/DL — SIGNIFICANT CHANGE UP (ref 0.2–1.2)
BUN SERPL-MCNC: 17 MG/DL — SIGNIFICANT CHANGE UP (ref 10–20)
CALCIUM SERPL-MCNC: 9.1 MG/DL — SIGNIFICANT CHANGE UP (ref 8.5–10.1)
CHLORIDE SERPL-SCNC: 95 MMOL/L — LOW (ref 98–110)
CO2 SERPL-SCNC: 26 MMOL/L — SIGNIFICANT CHANGE UP (ref 17–32)
CREAT SERPL-MCNC: 0.6 MG/DL — LOW (ref 0.7–1.5)
CRP SERPL-MCNC: 5 MG/L — HIGH
D DIMER BLD IA.RAPID-MCNC: 2295 NG/ML DDU — HIGH (ref 0–230)
EOSINOPHIL # BLD AUTO: 0 K/UL — SIGNIFICANT CHANGE UP (ref 0–0.7)
EOSINOPHIL NFR BLD AUTO: 0 % — SIGNIFICANT CHANGE UP (ref 0–8)
FERRITIN SERPL-MCNC: 611 NG/ML — HIGH (ref 30–400)
GIANT PLATELETS BLD QL SMEAR: PRESENT — SIGNIFICANT CHANGE UP
GLUCOSE SERPL-MCNC: 113 MG/DL — HIGH (ref 70–99)
HCT VFR BLD CALC: 39.8 % — LOW (ref 42–52)
HGB BLD-MCNC: 13.1 G/DL — LOW (ref 14–18)
LYMPHOCYTES # BLD AUTO: 0.2 K/UL — LOW (ref 1.2–3.4)
LYMPHOCYTES # BLD AUTO: 1.8 % — LOW (ref 20.5–51.1)
MAGNESIUM SERPL-MCNC: 2 MG/DL — SIGNIFICANT CHANGE UP (ref 1.8–2.4)
MANUAL SMEAR VERIFICATION: SIGNIFICANT CHANGE UP
MCHC RBC-ENTMCNC: 26.7 PG — LOW (ref 27–31)
MCHC RBC-ENTMCNC: 32.9 G/DL — SIGNIFICANT CHANGE UP (ref 32–37)
MCV RBC AUTO: 81.1 FL — SIGNIFICANT CHANGE UP (ref 80–94)
METAMYELOCYTES # FLD: 0.9 % — HIGH (ref 0–0)
MICROCYTES BLD QL: SIGNIFICANT CHANGE UP
MONOCYTES # BLD AUTO: 0.1 K/UL — SIGNIFICANT CHANGE UP (ref 0.1–0.6)
MONOCYTES NFR BLD AUTO: 0.9 % — LOW (ref 1.7–9.3)
MYELOCYTES NFR BLD: 0.9 % — HIGH (ref 0–0)
NEUTROPHILS # BLD AUTO: 10.72 K/UL — HIGH (ref 1.4–6.5)
NEUTROPHILS NFR BLD AUTO: 94.6 % — HIGH (ref 42.2–75.2)
PLAT MORPH BLD: NORMAL — SIGNIFICANT CHANGE UP
PLATELET # BLD AUTO: 304 K/UL — SIGNIFICANT CHANGE UP (ref 130–400)
POIKILOCYTOSIS BLD QL AUTO: SLIGHT — SIGNIFICANT CHANGE UP
POLYCHROMASIA BLD QL SMEAR: SLIGHT — SIGNIFICANT CHANGE UP
POTASSIUM SERPL-MCNC: 3.9 MMOL/L — SIGNIFICANT CHANGE UP (ref 3.5–5)
POTASSIUM SERPL-SCNC: 3.9 MMOL/L — SIGNIFICANT CHANGE UP (ref 3.5–5)
PROCALCITONIN SERPL-MCNC: 0.05 NG/ML — SIGNIFICANT CHANGE UP (ref 0.02–0.1)
PROT C ACT/NOR PPP: 150 % — HIGH (ref 65–129)
PROT SERPL-MCNC: 6.5 G/DL — SIGNIFICANT CHANGE UP (ref 6–8)
RBC # BLD: 4.91 M/UL — SIGNIFICANT CHANGE UP (ref 4.7–6.1)
RBC # FLD: 12.8 % — SIGNIFICANT CHANGE UP (ref 11.5–14.5)
RBC BLD AUTO: ABNORMAL
SMUDGE CELLS # BLD: PRESENT — SIGNIFICANT CHANGE UP
SODIUM SERPL-SCNC: 135 MMOL/L — SIGNIFICANT CHANGE UP (ref 135–146)
VARIANT LYMPHS # BLD: 0.9 % — SIGNIFICANT CHANGE UP (ref 0–5)
WBC # BLD: 11.33 K/UL — HIGH (ref 4.8–10.8)
WBC # FLD AUTO: 11.33 K/UL — HIGH (ref 4.8–10.8)

## 2021-05-13 PROCEDURE — 99232 SBSQ HOSP IP/OBS MODERATE 35: CPT

## 2021-05-13 PROCEDURE — 99238 HOSP IP/OBS DSCHRG MGMT 30/<: CPT

## 2021-05-13 RX ORDER — APIXABAN 2.5 MG/1
1 TABLET, FILM COATED ORAL
Qty: 30 | Refills: 0
Start: 2021-05-13 | End: 2021-06-11

## 2021-05-13 RX ORDER — APIXABAN 2.5 MG/1
2 TABLET, FILM COATED ORAL
Qty: 74 | Refills: 0
Start: 2021-05-13 | End: 2021-06-11

## 2021-05-13 RX ORDER — APIXABAN 2.5 MG/1
2 TABLET, FILM COATED ORAL
Qty: 28 | Refills: 0
Start: 2021-05-13 | End: 2021-05-19

## 2021-05-13 RX ORDER — DEXAMETHASONE 0.5 MG/5ML
1 ELIXIR ORAL
Qty: 40 | Refills: 0
Start: 2021-05-13

## 2021-05-13 RX ADMIN — PANTOPRAZOLE SODIUM 40 MILLIGRAM(S): 20 TABLET, DELAYED RELEASE ORAL at 06:18

## 2021-05-13 RX ADMIN — ENOXAPARIN SODIUM 130 MILLIGRAM(S): 100 INJECTION SUBCUTANEOUS at 06:17

## 2021-05-13 RX ADMIN — Medication 101.6 MILLIGRAM(S): at 06:16

## 2021-05-13 NOTE — DISCHARGE NOTE PROVIDER - NSDCCPCAREPLAN_GEN_ALL_CORE_FT
PRINCIPAL DISCHARGE DIAGNOSIS  Diagnosis: COVID-19  Assessment and Plan of Treatment: you were admitted for covid 19 pneumonia and suspected bacterial pneumonia on top of that. We gave you antibiotics course and treated the COVID with steroids, tocilizumab. you need to take a steroid taper over 2 weeks and follow=up with primary care doctor and pulmonary doctor.   Coronavirus disease 2019 (COVID-19) is a respiratory illness  that can spread from person to person. The virus that causes  COVID-19 is a novel coronavirus that was first identified during  an investigation into an outbreak in [place], [place].  The virus that causes COVID-19 probably emerged from an  animal source, but is now spreading from person to person.  The virus is thought to spread mainly between people who  are in close contact with one another (within about 6 feet)  through respiratory droplets produced when an infected  person coughs or sneezes. It also may be possible that a person  can get COVID-19 by touching a surface or object that has  the virus on it and then touching their own mouth, nose, or  possibly their eyes, but this is not thought to be the main  way the virus spreads.  Please stay home and avoid contact with others for at least a week after symptoms resolve and follow government guidelines.   Patients with COVID-19 have had mild to severe respiratory  illness with symptoms of  • fever  • cough  • shortness of breath  People can help protect themselves from respiratory illness with  everyday preventive actions.    • Avoid close contact with people who are sick.  • Avoid touching your eyes, nose, and mouth with  unwashed hands.  • Wash your hands often with soap and water for at least 20   seconds. Use an alcohol-based hand  that contains at  least 60% alcohol if soap and water are not available.   Stay home when you are sick.  • Cover your cough or sneeze with a tissue, then throw the  tissue in the trash.  • Clean and disinfect frequently touched objects  and surfaces.  Call 911 and inform them you are covid positive before you decide to go to the emergency room if you have chest pain,      SECONDARY DISCHARGE DIAGNOSES  Diagnosis: Pulmonary embolism  Assessment and Plan of Treatment: During this hospitalization, you were diagnosed with a pulmonary embolsim. We started you on blood thinners, you need to take them as prescribed to prevent further worsening of the blood clot. Follow=up with pulmonary doctor in 3-4 weeks.   To help prevent more blood clots from forming, please see your primary care doctor within one week of discharge, and please take your medicines exactly as instructed. Don’t skip doses. You have been prescribed a medication to thin the blood, so that more clots do not form.  Have all lab tests as recommended. This is very important when you take medicines to prevent blood clots. Make sure you stay active and walk for at least 30 minutes every day. When sitting for long periods of time, move your knees, ankles, feet, and toes.    If you smoke, get help to quit. Stay at a healthy weight. Try to exercise at least 30 minutes on most days. Before starting an exercise program, talk with your primary care provider. When traveling by car, make frequent stops to get up and move around. On long airplane rides, get up and move around when possible. If you can’t get up, wiggle your toes, move your ankles and tighten your calves to keep your blood moving.  Seek immediate medical care if you have pain, swelling, and redness in your leg, arm, or other body area. These symptoms may mean another blood clot. Also call your healthcare provider if you have signs and symptoms of bleeding, like blood in your urine, bleeding with bowel movements, or bleeding from the nose, gums, a cut, or vagina. Call 911 if you have symptoms of a blood clot in the lungs including: Chest pain, trouble breathing, coughing blood, fast heartbeat, heavy or uncontrolled bleeding.

## 2021-05-13 NOTE — PROGRESS NOTE ADULT - SUBJECTIVE AND OBJECTIVE BOX
JUANCHOKAYLAMICHAEL WONG  40y, Male    All available historical data reviewed    OVERNIGHT EVENTS:  no fevers  feels well and has no complaints     ROS:  General: Denies rigors, nightsweats  HEENT: Denies headache, rhinorrhea, sore throat, eye pain  CV: Denies CP, palpitations  PULM: Denies wheezing, hemoptysis  GI: Denies hematemesis, hematochezia, melena  : Denies discharge, hematuria  MSK: Denies arthralgias, myalgias  SKIN: Denies rash, lesions  NEURO: Denies paresthesias, weakness  PSYCH: Denies depression, anxiety    VITALS:  T(F): 97.3, Max: 98.9 (05-13-21 @ 00:26)  HR: 79  BP: 132/69  RR: 20Vital Signs Last 24 Hrs  T(C): 36.3 (13 May 2021 07:25), Max: 37.2 (13 May 2021 00:26)  T(F): 97.3 (13 May 2021 07:25), Max: 98.9 (13 May 2021 00:26)  HR: 79 (13 May 2021 07:25) (65 - 93)  BP: 132/69 (13 May 2021 07:25) (125/72 - 146/81)  BP(mean): 106 (12 May 2021 20:38) (106 - 106)  RR: 20 (13 May 2021 07:25) (18 - 20)  SpO2: 98% (13 May 2021 07:25) (95% - 98%)    TESTS & MEASUREMENTS:                        13.1   11.33 )-----------( 304      ( 13 May 2021 09:05 )             39.8     05-13    135  |  95<L>  |  17  ----------------------------<  113<H>  3.9   |  26  |  0.6<L>    Ca    9.1      13 May 2021 09:05  Mg     2.0     05-13    TPro  6.5  /  Alb  3.9  /  TBili  0.5  /  DBili  x   /  AST  37  /  ALT  79<H>  /  AlkPhos  97  05-13    LIVER FUNCTIONS - ( 13 May 2021 09:05 )  Alb: 3.9 g/dL / Pro: 6.5 g/dL / ALK PHOS: 97 U/L / ALT: 79 U/L / AST: 37 U/L / GGT: x                   RADIOLOGY & ADDITIONAL TESTS:  Personal review of radiological diagnostics performed  Echo and EKG results noted when applicable.     MEDICATIONS:  aluminum hydroxide/magnesium hydroxide/simethicone Suspension 30 milliLiter(s) Oral every 4 hours PRN  chlorhexidine 4% Liquid 1 Application(s) Topical <User Schedule>  dexAMETHasone  IVPB 8 milliGRAM(s) IV Intermittent every 12 hours  enoxaparin Injectable 130 milliGRAM(s) SubCutaneous every 12 hours  levoFLOXacin IVPB      levoFLOXacin IVPB 500 milliGRAM(s) IV Intermittent every 24 hours  pantoprazole    Tablet 40 milliGRAM(s) Oral before breakfast      ANTIBIOTICS:  levoFLOXacin IVPB      levoFLOXacin IVPB 500 milliGRAM(s) IV Intermittent every 24 hours

## 2021-05-13 NOTE — PROGRESS NOTE ADULT - NSICDXPILOT_GEN_ALL_CORE
[place]

## 2021-05-13 NOTE — PROGRESS NOTE ADULT - ASSESSMENT
39 y/o obese Male without significant PMX presented to the ED with worsening SOB and Hypoxia, admitted to SDU with severe COVID PNA. Onset of symptoms 5/1    #Acute Hypoxic Respiratory Failure  #COVID 19 PNA  #PE  Currently on 2L NC O2 saturating around 95-97%  CT Chest 05/11: b/l PE  s/p Toci and RDV  - DC planning in progress, with home O2  - Decadron taper over 14d (Steroids started 05/05)  - Will switch Lovenox to Eliquis on DC    DVT PPX, Lovenox    DC planning today, home with home O2

## 2021-05-13 NOTE — DISCHARGE NOTE NURSING/CASE MANAGEMENT/SOCIAL WORK - PATIENT PORTAL LINK FT
You can access the FollowMyHealth Patient Portal offered by [facility] by registering at the following website: [email or web address]. By joining [facility]’s FollowMyHealth portal, you will also be able to view your health information using other applications (apps) compatible with our system.

## 2021-05-13 NOTE — PROGRESS NOTE ADULT - REASON FOR ADMISSION
COVID
Pt requesting refill, OARRS complete. Last seen on 5/30/19. Left thumb open metacarpal fracture and laceration excisional debridement on 3/29/19. F/U scheduled for 7/1/19. Norco 5 mg every 6 hours was given on 5/29.
COVID

## 2021-05-13 NOTE — PROGRESS NOTE ADULT - SUBJECTIVE AND OBJECTIVE BOX
Patient is a 40y old  Male who presents with a chief complaint of COVID (12 May 2021 10:31)        Over Night Events:  OOB to chair.  On 2 liters O2.  No SOB at rest.          ROS:     All ROS are negative except HPI         PHYSICAL EXAM    ICU Vital Signs Last 24 Hrs  T(C): 36.8 (13 May 2021 00:35), Max: 37.2 (13 May 2021 00:26)  T(F): 98.2 (13 May 2021 00:35), Max: 98.9 (13 May 2021 00:26)  HR: 93 (13 May 2021 00:35) (65 - 93)  BP: 136/67 (13 May 2021 00:35) (125/72 - 146/81)  BP(mean): 106 (12 May 2021 20:38) (106 - 106)  ABP: --  ABP(mean): --  RR: 20 (13 May 2021 00:35) (18 - 20)  SpO2: 97% (13 May 2021 00:35) (95% - 97%)      CONSTITUTIONAL:  Well nourished.  NAD    ENT:   Airway patent,   Mouth with normal mucosa.   No thrush    EYES:   Pupils equal,   Round and reactive to light.    CARDIAC:   Normal rate,   Regular rhythm.    No edema      Vascular:  Normal systolic impulse  No Carotid bruits    RESPIRATORY:   No wheezing  Bilateral BS  Normal chest expansion  Not tachypneic,  No use of accessory muscles    GASTROINTESTINAL:  Abdomen soft,   Non-tender,   No guarding,   + BS    MUSCULOSKELETAL:   Range of motion is not limited,  No clubbing, cyanosis    NEUROLOGICAL:   Alert and oriented   No motor  deficits.    SKIN:   Skin normal color for race,   Warm and dry and intact.   No evidence of rash.    PSYCHIATRIC:   Normal mood and affect.   No apparent risk to self or others.    HEMATOLOGICAL:  No cervical  lymphadenopathy.  no inguinal lymphadenopathy        LABS:                            12.9   12.67 )-----------( 276      ( 12 May 2021 08:10 )             38.9                                               05-12    138  |  100  |  20  ----------------------------<  104<H>  4.9   |  25  |  0.6<L>    Ca    8.9      12 May 2021 08:10  Mg     2.2     05-12    TPro  6.5  /  Alb  3.8  /  TBili  0.5  /  DBili  x   /  AST  32  /  ALT  62<H>  /  AlkPhos  100  05-12                                                                                           LIVER FUNCTIONS - ( 12 May 2021 08:10 )  Alb: 3.8 g/dL / Pro: 6.5 g/dL / ALK PHOS: 100 U/L / ALT: 62 U/L / AST: 32 U/L / GGT: x                                                                                                                                       MEDICATIONS  (STANDING):  chlorhexidine 4% Liquid 1 Application(s) Topical <User Schedule>  dexAMETHasone  IVPB 8 milliGRAM(s) IV Intermittent every 12 hours  enoxaparin Injectable 130 milliGRAM(s) SubCutaneous every 12 hours  levoFLOXacin IVPB      levoFLOXacin IVPB 500 milliGRAM(s) IV Intermittent every 24 hours  pantoprazole    Tablet 40 milliGRAM(s) Oral before breakfast    MEDICATIONS  (PRN):  aluminum hydroxide/magnesium hydroxide/simethicone Suspension 30 milliLiter(s) Oral every 4 hours PRN Dyspepsia      New X-rays reviewed:                                                                                  ECHO    CXR interpreted by me:

## 2021-05-13 NOTE — PROGRESS NOTE ADULT - ASSESSMENT
IMPRESSION:    Acute hypoxemic respiratory failure slowly improving   Severe COVID pneumonia SP TOCI   Probable superimposed bacterial pneumonia  VTE     PLAN:    CNS: No depressants     HEENT: Oral care    PULMONARY:  HOB @ 45 degrees.  Aspiration precautions.  Continue HFNCO2.  Wean O2 as tolerated.  Dexa 6 mg Q 24 D#9.  Encourage incentive spirometry      CARDIOVASCULAR:  Avoid volume overload     GI: GI prophylaxis.  Feeding.  Bowel regimen     RENAL:  Follow up lytes.  Correct as needed    INFECTIOUS DISEASE: Follow up cultures.  DC ABX     HEMATOLOGICAL:  DVT therapy.  Eliquis     ENDOCRINE:  Follow up FS.     MUSCULOSKELETAL:  OOB to chair      CAn DC home from pulm stand point    OP Pulm follow up in 3-4 weeks     Prognosis guarded            IMPRESSION:    Acute hypoxemic respiratory failure slowly improving   Severe COVID pneumonia SP TOCI   Probable superimposed bacterial pneumonia  VTE     PLAN:    CNS: No depressants     HEENT: Oral care    PULMONARY:  HOB @ 45 degrees.  Aspiration precautions.  Continue HFNCO2.  Wean O2 as tolerated.  Dexa 6 mg Q 24 D#9.  Taper over total 2 weeks period.  Encourage incentive spirometry      CARDIOVASCULAR:  Avoid volume overload     GI: GI prophylaxis.  Feeding.  Bowel regimen     RENAL:  Follow up lytes.  Correct as needed    INFECTIOUS DISEASE: Follow up cultures.  DC ABX     HEMATOLOGICAL:  DVT therapy.  Eliquis     ENDOCRINE:  Follow up FS.     MUSCULOSKELETAL:  OOB to chair      CAn DC home from pulm stand point    OP Pulm follow up in 3-4 weeks     Prognosis guarded

## 2021-05-13 NOTE — DISCHARGE NOTE PROVIDER - NSDCFUADDINST_GEN_ALL_CORE_FT
Make appointment with Dr Quarles within 3-4 weeks of discharge as well as your primary care doctor within 1 week of discharge.     Return to ED if worsenign symptoms, shortness of breath, chest pain, difficulty breathign.

## 2021-05-13 NOTE — PROGRESS NOTE ADULT - PROVIDER SPECIALTY LIST ADULT
Hospitalist
Infectious Disease
Internal Medicine
Infectious Disease
Internal Medicine
Pulmonology
Internal Medicine
Infectious Disease

## 2021-05-13 NOTE — DISCHARGE NOTE PROVIDER - CARE PROVIDER_API CALL
Wes Quarles)  Critical Care Medicine; Pulmonary Disease; Sleep Medicine  [address] [place] [address]  Phone: [phone]  Fax: [phone]  Follow Up Time: 1 month

## 2021-05-13 NOTE — DISCHARGE NOTE PROVIDER - HOSPITAL COURSE
39 y/o M, obese with no PMH presents with SOB. Patient woke up May 1 with bad headache. His wife had tested positive for COVID, so he went to get tested and was positive. Today he noticed his SOB was worsening so he went get plasma, while there they noted he was saturating in low 80s so they did not give him plasma and sent him to the hospital. He denies chest pain, leg pain, abdominal pain, fever, chills or any other symptoms.    ED course: T(F): 99.1, HR: 91, BP: 130/78, RR: 20, SpO2: 82% on 5L. Xray showed diffuse b/l patchy opacities. COVID positive. High flow at 60/100%, saturating 98%. He received ceftriaxone and azithromycin and is admitted to ICU/COVID ICU/CEU with diagnosis of COVID pneumonia for close monitoring.    CEU/COVID ICu course:     Antibiotics were adjusted for suspected superimposed pneumonia per ID - ceftriaxone and levaquin, given toci due to elevated inflammatory markers, patient was given remdesivir, dexamethasone increased to 8mg q 8hours on 5/7, patient was placed on bipap and hfnc alternating. due to increased d dimer, duplexes were performed (negative) and CT angio was performed 5/11 , confirming bilateral lower segmental PE (acute), at which point patient was placed on therapeutic lovenox (patient was enrolled in lovenox trial - see research communication notes).  Patient status improved, o2 requirements were weaned to 2L nasal cannula and stable for discharge home w/ O2 PRN and eliquis and dexamethasone taper.    Plan and meds discussed w/ attending.

## 2021-05-13 NOTE — PROGRESS NOTE ADULT - SUBJECTIVE AND OBJECTIVE BOX
DAGOBERTOMICHAEL  40y, Male  Allergy: No Known Allergies    Hospital Day: 8d    Patient seen and examined earlier today. No acute events overnight.    PMH/PSH:  PAST MEDICAL & SURGICAL HISTORY:  No pertinent past medical history    No significant past surgical history    VITALS:  T(F): 97.3 (05-13-21 @ 07:25), Max: 98.9 (05-13-21 @ 00:26)  HR: 79 (05-13-21 @ 07:25)  BP: 132/69 (05-13-21 @ 07:25) (125/72 - 146/81)  RR: 20 (05-13-21 @ 07:25)  SpO2: 98% (05-13-21 @ 07:25)    TESTS & MEASUREMENTS:  Weight (Kg):       05-11-21 @ 07:01  -  05-12-21 @ 07:00  --------------------------------------------------------  IN: 100 mL / OUT: 0 mL / NET: 100 mL                        13.1   11.33 )-----------( 304      ( 13 May 2021 09:05 )             39.8     05-13    135  |  95<L>  |  17  ----------------------------<  113<H>  3.9   |  26  |  0.6<L>    Ca    9.1      13 May 2021 09:05  Mg     2.0     05-13    TPro  6.5  /  Alb  3.9  /  TBili  0.5  /  DBili  x   /  AST  37  /  ALT  79<H>  /  AlkPhos  97  05-13    LIVER FUNCTIONS - ( 13 May 2021 09:05 )  Alb: 3.9 g/dL / Pro: 6.5 g/dL / ALK PHOS: 97 U/L / ALT: 79 U/L / AST: 37 U/L / GGT: x           RECENT DIAGNOSTIC ORDERS:  Ferritin, Serum: AM Sched. Collection: 13-May-2021 04:30 (05-13-21 @ 00:00)  Procalcitonin, Serum: AM Sched. Collection: 13-May-2021 04:30 (05-12-21 @ 15:57)  C-Reactive Protein, Serum: AM Sched. Collection: 13-May-2021 04:30 (05-12-21 @ 15:57)  D-Dimer Assay, Quantitative:  Start Date:12-May-2021. AM Sched. Collection:13-May-2021 04:30 (05-12-21 @ 15:57)    MEDICATIONS:  MEDICATIONS  (STANDING):  chlorhexidine 4% Liquid 1 Application(s) Topical <User Schedule>  dexAMETHasone  IVPB 8 milliGRAM(s) IV Intermittent every 12 hours  enoxaparin Injectable 130 milliGRAM(s) SubCutaneous every 12 hours  levoFLOXacin IVPB      levoFLOXacin IVPB 500 milliGRAM(s) IV Intermittent every 24 hours  pantoprazole    Tablet 40 milliGRAM(s) Oral before breakfast    MEDICATIONS  (PRN):  aluminum hydroxide/magnesium hydroxide/simethicone Suspension 30 milliLiter(s) Oral every 4 hours PRN Dyspepsia    HOME MEDICATIONS:    REVIEW OF SYSTEMS:  All other review of systems is negative unless indicated above.     PHYSICAL EXAM:  GENERAL: NAD  HEENT: No Swelling  CHEST/LUNG: Good air entry, No wheezing  HEART: RRR, No murmurs  ABDOMEN: Soft, Bowel sounds present  EXTREMITIES:  No clubbing

## 2021-05-13 NOTE — DISCHARGE NOTE PROVIDER - NSDCMRMEDTOKEN_GEN_ALL_CORE_FT
apixaban 5 mg oral tablet: 2 tab(s) orally 2 times a day   Last dose 05/20/21 at night  apixaban 5 mg oral tablet: 1 tab(s) orally once a day start on 05/21/21 in the morning  dexamethasone 2 mg oral tablet: 4 tab(s) orally once a day for 4 days, then 3 tabs orally once a day for 4 days, then 2 tabs orally for 4 days then 1 tab orally for 4 days.

## 2021-05-14 ENCOUNTER — TRANSCRIPTION ENCOUNTER (OUTPATIENT)
Age: 41
End: 2021-05-14

## 2021-05-14 VITALS
SYSTOLIC BLOOD PRESSURE: 117 MMHG | TEMPERATURE: 96 F | OXYGEN SATURATION: 96 % | HEART RATE: 63 BPM | RESPIRATION RATE: 20 BRPM | DIASTOLIC BLOOD PRESSURE: 79 MMHG

## 2021-05-17 ENCOUNTER — TRANSCRIPTION ENCOUNTER (OUTPATIENT)
Age: 41
End: 2021-05-17

## 2021-05-18 ENCOUNTER — TRANSCRIPTION ENCOUNTER (OUTPATIENT)
Age: 41
End: 2021-05-18

## 2021-05-19 DIAGNOSIS — J15.9 UNSPECIFIED BACTERIAL PNEUMONIA: ICD-10-CM

## 2021-05-19 DIAGNOSIS — R74.01 ELEVATION OF LEVELS OF LIVER TRANSAMINASE LEVELS: ICD-10-CM

## 2021-05-19 DIAGNOSIS — J12.82 PNEUMONIA DUE TO CORONAVIRUS DISEASE 2019: ICD-10-CM

## 2021-05-19 DIAGNOSIS — U07.1 COVID-19: ICD-10-CM

## 2021-05-19 DIAGNOSIS — R05 COUGH: ICD-10-CM

## 2021-05-19 DIAGNOSIS — E66.9 OBESITY, UNSPECIFIED: ICD-10-CM

## 2021-05-19 DIAGNOSIS — I26.99 OTHER PULMONARY EMBOLISM WITHOUT ACUTE COR PULMONALE: ICD-10-CM

## 2021-05-19 DIAGNOSIS — J96.01 ACUTE RESPIRATORY FAILURE WITH HYPOXIA: ICD-10-CM

## 2021-05-19 DIAGNOSIS — Z00.6 ENCOUNTER FOR EXAMINATION FOR NORMAL COMPARISON AND CONTROL IN CLINICAL RESEARCH PROGRAM: ICD-10-CM

## 2021-05-20 PROBLEM — Z78.9 OTHER SPECIFIED HEALTH STATUS: Chronic | Status: ACTIVE | Noted: 2021-05-05

## 2021-06-02 PROBLEM — Z00.00 ENCOUNTER FOR PREVENTIVE HEALTH EXAMINATION: Status: ACTIVE | Noted: 2021-06-02

## 2021-06-03 ENCOUNTER — APPOINTMENT (OUTPATIENT)
Age: 41
End: 2021-06-03
Payer: COMMERCIAL

## 2021-06-03 VITALS
HEIGHT: 69 IN | RESPIRATION RATE: 14 BRPM | DIASTOLIC BLOOD PRESSURE: 94 MMHG | WEIGHT: 282 LBS | SYSTOLIC BLOOD PRESSURE: 134 MMHG | OXYGEN SATURATION: 97 % | BODY MASS INDEX: 41.77 KG/M2 | HEART RATE: 106 BPM

## 2021-06-03 DIAGNOSIS — G47.33 OBSTRUCTIVE SLEEP APNEA (ADULT) (PEDIATRIC): ICD-10-CM

## 2021-06-03 DIAGNOSIS — J12.9 VIRAL PNEUMONIA, UNSPECIFIED: ICD-10-CM

## 2021-06-03 PROCEDURE — 99214 OFFICE O/P EST MOD 30 MIN: CPT | Mod: 25

## 2021-06-03 PROCEDURE — 99072 ADDL SUPL MATRL&STAF TM PHE: CPT

## 2021-06-03 PROCEDURE — 71046 X-RAY EXAM CHEST 2 VIEWS: CPT

## 2021-06-03 PROCEDURE — G0447 BEHAVIOR COUNSEL OBESITY 15M: CPT | Mod: 59

## 2021-06-03 NOTE — REASON FOR VISIT
[Follow-Up - From Hospitalization] : a follow-up visit after a recent hospitalization [Sleep Apnea] : sleep apnea [Pneumonia] : pneumonia

## 2021-06-03 NOTE — PROCEDURE
[FreeTextEntry1] : CXR PA and Lateral \par The costophrenic and cardiophrenic angles are sharp\par The cristopher parenchyma shows improving infiltrates.  NO consolidations, or nodules \par The Mediastinum is within normal limits\par No pleural effusions\par

## 2021-06-03 NOTE — HISTORY OF PRESENT ILLNESS
[Follow-Up - Routine Clinic] : a routine clinic follow-up of [Confirmed] : confirmed [Wheezing] : no wheezing [Dyspnea] : no dyspnea [Fever] : no fever [de-identified] : COVID 19  [Follow-Up - From Hospitalization] : follow-up of a hospitalization for [Excessive Daytime Sleepiness] : excessive daytime sleepiness [Snoring] : snoring [Unrefreshing Sleep] : unrefreshing sleep [Currently Experiencing] : The patient is currently experiencing symptoms. [None] : No associated symptoms are reported [Good Sleep Hygiene] : good sleep hygiene

## 2021-06-03 NOTE — ASSESSMENT
[FreeTextEntry1] : SP ARF Secondary to severe COVID 19 pneumonia \par Possible SAVI \par PE likely provoked by recent hospital stay with COVID

## 2021-06-03 NOTE — COUNSELING
Percutaneous stick to the right groin.  [Potential consequences of obesity discussed] : Potential consequences of obesity discussed [Good understanding] : Patient has a good understanding of disease, goals and obesity follow-up plan [FreeTextEntry4] : 15

## 2021-06-28 ENCOUNTER — LABORATORY RESULT (OUTPATIENT)
Age: 41
End: 2021-06-28

## 2021-06-28 ENCOUNTER — OUTPATIENT (OUTPATIENT)
Dept: OUTPATIENT SERVICES | Facility: HOSPITAL | Age: 41
LOS: 1 days | Discharge: HOME | End: 2021-06-28

## 2021-06-28 DIAGNOSIS — Z11.59 ENCOUNTER FOR SCREENING FOR OTHER VIRAL DISEASES: ICD-10-CM

## 2021-07-14 DIAGNOSIS — I26.99 OTHER PULMONARY EMBOLISM W/OUT ACUTE COR PULMONALE: ICD-10-CM

## 2021-07-14 RX ORDER — APIXABAN 5 MG/1
5 TABLET, FILM COATED ORAL
Qty: 30 | Refills: 3 | Status: ACTIVE | COMMUNITY
Start: 2021-06-07 | End: 1900-01-01

## 2021-07-15 ENCOUNTER — APPOINTMENT (OUTPATIENT)
Age: 41
End: 2021-07-15

## 2021-09-06 ENCOUNTER — LABORATORY RESULT (OUTPATIENT)
Age: 41
End: 2021-09-06

## 2021-09-06 ENCOUNTER — OUTPATIENT (OUTPATIENT)
Dept: OUTPATIENT SERVICES | Facility: HOSPITAL | Age: 41
LOS: 1 days | Discharge: HOME | End: 2021-09-06

## 2021-09-06 DIAGNOSIS — Z11.59 ENCOUNTER FOR SCREENING FOR OTHER VIRAL DISEASES: ICD-10-CM

## 2021-09-09 ENCOUNTER — OUTPATIENT (OUTPATIENT)
Dept: OUTPATIENT SERVICES | Facility: HOSPITAL | Age: 41
LOS: 1 days | Discharge: HOME | End: 2021-09-09
Payer: COMMERCIAL

## 2021-09-09 DIAGNOSIS — R06.02 SHORTNESS OF BREATH: ICD-10-CM

## 2021-09-09 PROCEDURE — 94060 EVALUATION OF WHEEZING: CPT | Mod: 26

## 2021-09-09 PROCEDURE — 94729 DIFFUSING CAPACITY: CPT | Mod: 26

## 2021-09-09 PROCEDURE — 94727 GAS DIL/WSHOT DETER LNG VOL: CPT | Mod: 26

## 2025-06-27 ENCOUNTER — NON-APPOINTMENT (OUTPATIENT)
Age: 45
End: 2025-06-27

## 2025-06-27 ENCOUNTER — APPOINTMENT (OUTPATIENT)
Age: 45
End: 2025-06-27
Payer: COMMERCIAL

## 2025-06-27 VITALS — HEIGHT: 69 IN | BODY MASS INDEX: 41.47 KG/M2 | WEIGHT: 280 LBS

## 2025-06-27 VITALS — HEIGHT: 60 IN | BODY MASS INDEX: 54.97 KG/M2 | WEIGHT: 280 LBS

## 2025-06-27 PROCEDURE — 99203 OFFICE O/P NEW LOW 30 MIN: CPT
